# Patient Record
Sex: MALE | Race: WHITE | NOT HISPANIC OR LATINO | Employment: OTHER | URBAN - METROPOLITAN AREA
[De-identification: names, ages, dates, MRNs, and addresses within clinical notes are randomized per-mention and may not be internally consistent; named-entity substitution may affect disease eponyms.]

---

## 2021-11-16 ENCOUNTER — TELEPHONE (OUTPATIENT)
Dept: FAMILY MEDICINE | Facility: CLINIC | Age: 66
End: 2021-11-16
Payer: MEDICARE

## 2021-11-17 ENCOUNTER — TELEPHONE (OUTPATIENT)
Dept: FAMILY MEDICINE | Facility: CLINIC | Age: 66
End: 2021-11-17
Payer: MEDICARE

## 2022-01-06 ENCOUNTER — OFFICE VISIT (OUTPATIENT)
Dept: INFECTIOUS DISEASES | Facility: CLINIC | Age: 67
End: 2022-01-06
Payer: MEDICARE

## 2022-01-06 VITALS
RESPIRATION RATE: 18 BRPM | WEIGHT: 212 LBS | SYSTOLIC BLOOD PRESSURE: 120 MMHG | BODY MASS INDEX: 28.71 KG/M2 | DIASTOLIC BLOOD PRESSURE: 60 MMHG | HEIGHT: 72 IN

## 2022-01-06 DIAGNOSIS — B19.20 HEPATITIS C VIRUS INFECTION WITHOUT HEPATIC COMA, UNSPECIFIED CHRONICITY: Primary | ICD-10-CM

## 2022-01-06 DIAGNOSIS — K74.00 FIBROSIS OF LIVER: ICD-10-CM

## 2022-01-06 PROCEDURE — 99204 OFFICE O/P NEW MOD 45 MIN: CPT | Mod: S$GLB,,, | Performed by: NURSE PRACTITIONER

## 2022-01-06 PROCEDURE — 99204 PR OFFICE/OUTPT VISIT, NEW, LEVL IV, 45-59 MIN: ICD-10-PCS | Mod: S$GLB,,, | Performed by: NURSE PRACTITIONER

## 2022-01-06 RX ORDER — METFORMIN HYDROCHLORIDE 1000 MG/1
1000 TABLET ORAL 2 TIMES DAILY WITH MEALS
COMMUNITY
Start: 2021-12-20

## 2022-01-06 RX ORDER — GLIPIZIDE 10 MG/1
10 TABLET ORAL DAILY
COMMUNITY
Start: 2021-12-31

## 2022-01-06 RX ORDER — LISINOPRIL 20 MG/1
TABLET ORAL
COMMUNITY
Start: 2021-10-15

## 2022-01-06 NOTE — PATIENT INSTRUCTIONS
EDUCATION:  The natural history of Hepatitis C, including potential progression to cirrhosis was reviewed. We discussed the increased progression of liver disease secondary to alcohol use; patient was advised to avoid alcohol completely.      Transmission of Hepatitis C was reviewed, including possible sexual transmission. Sexual contacts should be screened. Patient should avoid sharing personal products such as razors, toothbrushes, etc.      Recommend avoiding raw seafood.  Limit acetaminophen to 2000mg daily.  What can you do about your liver???  1. Avoid alcohol and/or drugs  2. Avoid NSAIDs (ibuprofen, naproxen, aleve, etc)  3. Limit the use of Tylenol containing products  4. Avoid sleeping pills  5. Receive Hepatitis A & B vaccinations  6. Receive Pneumococcal vaccinations in addition to annual flu vaccines  7. Avoid sodium in your diet. Avoid canned or prepared foods   8. Avoid constipation   9. Quit smoking    Patient Education       Hepatitis C Discharge Instructions   About this topic   Hepatitis C is a disease that harms the liver. It is caused by a virus and can either cause an acute or chronic infection. The liver becomes damaged and does not work well. The virus spreads from person to person through contact with infected blood. Two ways this may happen are having unprotected sex or sharing needles. Also, children born to a mother with hepatitis C may get hepatitis C. There are many other ways you may get hepatitis C. It is the most serious kind of hepatitis. Many people have hepatitis C but they do not know it because they have no symptoms. It is important to know you cannot get hepatitis C from hugging, kissing, or sharing food.  Hepatitis C can often be cured with drugs. If not treated, some people will need a liver transplant.     What care is needed at home?   · Ask your doctor what you need to do when you go home. Make sure you ask questions if you do not understand what the doctor says. This way  you will know what you need to do.  · Take all of your drugs exactly as the doctor ordered. This is very important. You may want to use an alarm or talking pillbox to help you remember to take each dose on time and to not miss a dose.  · Never stop taking your drugs or change the dose without asking your doctor first.  · Heat may be used to help with belly pain. If your doctor tells you to use heat, put a heating pad on your belly for no more than 20 minutes at a time. Never go to sleep with a heating pad on as this can cause burns.  · Drink plenty of fluids whenever you are thirsty.  · Do not drink beer, wine, and mixed drinks (alcohol) as this can cause more liver damage.  · Avoid taking drugs and substances that can cause more harm to your liver. Ask your doctor before taking any drugs, vitamins, or supplements.  What follow-up care is needed?   Your condition needs close monitoring. Your doctor may ask you to make visits to the office to check on your progress. Be sure to keep these visits.  What drugs may be needed?   The doctor may order drugs to:  · Help with pain  · Prevent infection  · Slow or stop the virus from damaging your liver  · Protect you from other kinds of hepatitis  The drugs may cause side effects. Talk to your doctor if you are having problems taking any of your drugs.  Will physical activity be limited?   You may have to limit your activity. Talk to your doctor about the right amount of activity for you.  What changes to diet are needed?   Eating a healthy diet is important during this time. This means:  · Eat whole grain foods and foods high in fiber.  · Choose many different fruits and vegetables. Fresh or frozen is best.  · Cut back on solid fats like butter or margarine. Eat less fatty and processed foods.  · Eat more low-fat or lean meats like chicken, fish, or turkey. Eat less red meat.  · Avoid foods high in sugar.  · If you need help, ask to see a dietitian.  What problems could  happen?   · Liver damage  · Liver failure  What can be done to prevent this health problem?   · Do not share anything that might have blood on it. This includes razors, clippers, toothbrush, needles, etc.  · Wear gloves if you need to handle an infected person's blood.  · Use a condom each time you have sex. Limit your number of sexual partners.  · Make sure your  uses sterile tools.  · Do not donate blood if you have hepatitis C.  · Handle used needles and cutting tools with care.  · Place used needles in a properly labeled container.  · Cover cuts and wounds with a clean dressing.  When do I need to call the doctor?   · Signs of infection. These include a fever of 100.4°F (38°C) or higher, chills.  · Throwing up, upset stomach, or loose stools that persist  · Bloody or black stools  · Very dark yellow urine  · Yellow skin or eyes  · Swelling in your belly  · Itching  Teach Back: Helping You Understand   The Teach Back Method helps you understand the information we are giving you. After you talk with the staff, tell them in your own words what you learned. This helps to make sure the staff has described each thing clearly. It also helps to explain things that may have been confusing. Before going home, make sure you can do these:  · I can tell you about my condition.  · I can tell you what changes I need to make with my diet or drugs.  · I can tell you what I will do if I have problems with throwing up, upset stomach, or loose stools or my skin or eyes are yellow.  Where can I learn more?   Centers for Disease Control and Prevention  https://www.cdc.gov/hepatitis/hcv/cfaq.htm   National [place] of Diabetes and Digestive and Kidney Diseases  https://www.niddk.nih.gov/health-information/liver-disease/viral-hepatitis/hepatitis-c   World Health Organization  https://www.who.int/news-room/fact-sheets/detail/hepatitis-c   Last Reviewed Date   2021-03-12  Consumer Information Use and Disclaimer   This  information is not specific medical advice and does not replace information you receive from your health care provider. This is only a brief summary of general information. It does NOT include all information about conditions, illnesses, injuries, tests, procedures, treatments, therapies, discharge instructions or life-style choices that may apply to you. You must talk with your health care provider for complete information about your health and treatment options. This information should not be used to decide whether or not to accept your health care providers advice, instructions or recommendations. Only your health care provider has the knowledge and training to provide advice that is right for you.  Copyright   Copyright © 2021 [employer] and its affiliates and/or licensors. All rights reserved.

## 2022-01-06 NOTE — PROGRESS NOTES
Infectious Diseases Clinic Note    Subjective:       Patient ID: Chuy Arzate is a 66 y.o. male     Chief Complaint: Referral (+ HCV)      CHIEF COMPLAINT: Hepatitis C     HISTORY:      66-year-old male here today accompanied by his wife. Resident of [facility].  Patient referred by his primary care provider, Dr. Fuentes.  Past medical history of hypertension, hyperlipidemia, type 2 diabetes.  Recently tested positive for hepatitis C after his PCP noticed transaminitis. He is unsure how he may have contracted the illness.  He was noted to have a F4 Fibrosis score and referred here for Hep C treatment.       He denies IVDA, ETOH, Tattoos, unprotected sex, MSM, incarcerated.  No prior blood transfusions or surgical procedures.        Denies jaundice, dark urine, hematemesis, melena, slowed mentation, abdominal distention. he feels well overall. No acute issues reported.         HCV history:    Originally diagnosed: 2021  Prior icteric illnesses: None  Risks for HCV: none   - Treatment naïve  - Genotype unknown  - NS5A resistance not known    · Immunity to HAV & HBV none to date   · HIV screen neg  · Hep B Surf Ag Non-reactive  · Hep B Surf Ab non-reactive  · Hep B Core total Ab non-reactive   · Abnormal liver imaging: none ordered        Liver staging:    FibroScan = kPa ,  (CAP , S )  Fibrosure = F4   Liver function = AST/ALT elevated   U/S =  CT Abd/pevis =      FIB-4 --   APRI -   MELDS --    HPI           Past Medical History:   Diagnosis Date    Chronic hepatitis C     DM (diabetes mellitus), type 2     HTN (hypertension)     Liver fibrosis        Social History     Socioeconomic History    Marital status:    Tobacco Use    Smoking status: Current Every Day Smoker     Years: 1.00     Types: Cigarettes    Smokeless tobacco: Never Used   Substance and Sexual Activity    Alcohol use: Not Currently    Drug use: Never         Current Outpatient Medications:     glipiZIDE (GLUCOTROL) 10 MG tablet, Take 10 mg  by mouth once daily., Disp: , Rfl:     lisinopriL (PRINIVIL,ZESTRIL) 20 MG tablet, , Disp: , Rfl:     metFORMIN (GLUCOPHAGE) 1000 MG tablet, Take 1,000 mg by mouth 2 (two) times daily with meals., Disp: , Rfl:     hepatitis A and B vaccine, PF, (TWINRIX) 720 CHRIS unit- 20 mcg/mL Syrg suspension, 1 mL given IM on a 0-, 1-, and 6-month schedule for a total of 3 doses, Disp: 1 mL, Rfl: 2    Review of Systems   Constitutional: Negative for appetite change, chills, diaphoresis, fever and unexpected weight change.   Eyes: Negative for visual disturbance.   Respiratory: Negative for cough, chest tightness, shortness of breath and wheezing.    Cardiovascular: Negative for chest pain, palpitations and leg swelling.   Gastrointestinal: Negative for abdominal distention, abdominal pain, blood in stool, diarrhea, nausea and vomiting.   Endocrine: Negative for polydipsia, polyphagia and polyuria.   Genitourinary: Negative for difficulty urinating, dysuria, flank pain and hematuria.   Musculoskeletal: Negative for back pain, myalgias and neck pain.   Skin: Negative for color change and rash.   Neurological: Negative for dizziness, tremors, syncope and headaches.   Hematological: Negative for adenopathy. Does not bruise/bleed easily.   Psychiatric/Behavioral: Negative for confusion, dysphoric mood, hallucinations, sleep disturbance and suicidal ideas.           Objective:      Vitals:    01/06/22 1335   BP: 120/60   Resp: 18     Physical Exam  Vitals and nursing note reviewed.   Constitutional:       General: He is awake. He is not in acute distress.     Appearance: Normal appearance. He is well-developed, well-groomed and well-nourished. He is not ill-appearing or toxic-appearing.   Eyes:      General: No scleral icterus.     Pupils: Pupils are equal, round, and reactive to light.   Cardiovascular:      Rate and Rhythm: Normal rate.   Pulmonary:      Effort: Pulmonary effort is normal.   Chest:   Breasts:      Right: No  supraclavicular adenopathy.      Left: No supraclavicular adenopathy.       Abdominal:      General: Abdomen is protuberant. Bowel sounds are normal. There is no distension.      Palpations: Abdomen is soft. There is no fluid wave, hepatomegaly or splenomegaly.      Tenderness: There is no abdominal tenderness.   Musculoskeletal:         General: No edema. Normal range of motion.      Cervical back: Normal range of motion.      Right lower leg: No edema.      Left lower leg: No edema.   Lymphadenopathy:      Upper Body:      Right upper body: No supraclavicular adenopathy.      Left upper body: No supraclavicular adenopathy.   Skin:     General: Skin is warm and dry.      Capillary Refill: Capillary refill takes less than 2 seconds.      Coloration: Skin is not jaundiced.      Findings: No rash.   Neurological:      Mental Status: He is alert and oriented to person, place, and time. Mental status is at baseline.   Psychiatric:         Mood and Affect: Mood and affect and mood normal.         Behavior: Behavior normal. Behavior is cooperative.         Thought Content: Thought content normal.         Judgment: Judgment normal.             Assessment/Plan:       1. Hepatitis C virus infection without hepatic coma, unspecified chronicity    2. Fibrosis of liver      Problem List Items Addressed This Visit        GI    Hepatitis C virus infection without hepatic coma - Primary    Current Assessment & Plan          HCV history:    Originally diagnosed: 2021  Prior icteric illnesses: None  Risks for HCV:  none  - Treatment naïve  - Genotype   - NS5A resistance not known    · Immunity to HAV & HBV ?  · HIV screen NEG  · Hep B Surf Ag Non-reactive  · Hep B Surf Ab non-reactive  · Hep B Core total Ab NON-REACTIVE   · Abnormal liver imaging:     HCV RNA [id number]    FIBROSURE 0.79 - severe firbrosis/cirrhosis      Liver staging:    FibroScan = kPa ,(CAP , S )  Fibrosure = F4  Liver function = ALT 62, , , Tbili  1.1  U/S =  CT Abd/pevis =      FIB-4 --   APRI -   MELDS --        PLAN    1. Vaccinate Hep A/B. Given order  2. Get US to assess cirrhosis  3. Complete workup. Pt given orders. RTC in 3 weeks to discuss.  4. Repeat Fibrosure to confirm cirrhosis.   5. Discussed treatment duration 12-24 weeks which may include ribavirin.   6. Monitor for increased atorvastatin adverse effects (eg, myopathy, rhabdomyolysis) if atorvastatin and sofosbuvir are combined. May need to switch to Pravastatin at that time.  7. Drug to drug potentials reviewed.   8. See AVS for Hep C education.       The following information was discussed with the patient:     You are at higher risk for Hepatocellular Carcinoma (HCC).  Compensated   - Presence of scarring/fibrosis throughout the entire liver  - Lacking symptoms and signs of liver disease  o Jaundice (yellowing skin)  o Ascites (Swelling/fluid in the abdomen)  o Hepatic encephalopathy (Neurologic symptoms from toxin buildup)  o Esophageal varices (bleeding veins)  o Scleral icterus (yellowing of eyes)  o Easy bruising/bleeding  o Fatigue  o Black tarry stools  o Nausea/vomiting  o Abdominal pain is NOT associated   o Lesions or blisters of sun exposed skin  Your follow up will be every 6 months IF you are confirmed to have cirrhosis.  You are at higher risk for HCC and will need routine testing for life:  1. Ultrasound every 6 months   2. Checking for liver cancer (AFP tumor marker) every 6 months  3. Complete blood counts and complete metabolic panel  You will need an EGD initially and every 2-3 years thereafter.          Relevant Medications    hepatitis A and B vaccine, PF, (TWINRIX) 720 CHRIS unit- 20 mcg/mL Syrg suspension    Other Relevant Orders    Hepatitis C RNA, Quantitative, PCR    HCV Fibrosure    Hepatitis C Genotype    Hepatitis A Antibody, IgM    Treponema Pallidum (Syphillis) Antibody    Phosphatidylethanol (PETH)    Ammonia    Ceruloplasmin    Alpha-1-Antitrypsin    IgG     AFP Tumor Marker    CBC Auto Differential    Comprehensive Metabolic Panel    Iron, TIBC and Ferritin Panel    Lactate Dehydrogenase    Protime-INR    APTT    US Abdomen Complete      Other Visit Diagnoses     Fibrosis of liver        Relevant Medications    hepatitis A and B vaccine, PF, (TWINRIX) 720 CHRIS unit- 20 mcg/mL Syrg suspension    Other Relevant Orders    Hepatitis C RNA, Quantitative, PCR    HCV Fibrosure    Hepatitis C Genotype    Hepatitis A Antibody, IgM    Treponema Pallidum (Syphillis) Antibody    Phosphatidylethanol (PETH)    Ammonia    Ceruloplasmin    Alpha-1-Antitrypsin    IgG    AFP Tumor Marker    CBC Auto Differential    Comprehensive Metabolic Panel    Iron, TIBC and Ferritin Panel    Lactate Dehydrogenase    Protime-INR    APTT    US Abdomen Complete         No visits with results within 1 Month(s) from this visit.   Latest known visit with results is:   No results found for any previous visit.      No results found in the last 30 days.       All diagnostic data (labs/imaging) was reviewed with the patient and/or family member in the room.  All questions were answered to their liking. The patient and/or family member voiced understanding of all instructions provided. Expectations regarding follow up and treatment plan were voiced and confirmed prior to departure. The patient was given orders/instructions at the end of the visit for reference. They were instructed to notify my office if they have not been contacted for imaging/referrals/labs/results in 1-2 weeks. They voiced understanding of all of the above.       Duration of encounter: 45 minutes  This includes face-to-face time and non face-to-face time preparing to see the patient (eg, review of tests), obtaining and/or reviewing separately obtained history, documenting clinical information in the electronic or other health record, independently interpreting resultsand communicating results to the patient/family/caregiver, or care  coordination.    Follow up:     Patient Instructions   EDUCATION:  The natural history of Hepatitis C, including potential progression to cirrhosis was reviewed. We discussed the increased progression of liver disease secondary to alcohol use; patient was advised to avoid alcohol completely.      Transmission of Hepatitis C was reviewed, including possible sexual transmission. Sexual contacts should be screened. Patient should avoid sharing personal products such as razors, toothbrushes, etc.      Recommend avoiding raw seafood.  Limit acetaminophen to 2000mg daily.  What can you do about your liver???  4. Avoid alcohol and/or drugs  5. Avoid NSAIDs (ibuprofen, naproxen, aleve, etc)  6. Limit the use of Tylenol containing products  7. Avoid sleeping pills  8. Receive Hepatitis A & B vaccinations  9. Receive Pneumococcal vaccinations in addition to annual flu vaccines  10. Avoid sodium in your diet. Avoid canned or prepared foods   11. Avoid constipation   12. Quit smoking    Patient Education       Hepatitis C Discharge Instructions   About this topic   Hepatitis C is a disease that harms the liver. It is caused by a virus and can either cause an acute or chronic infection. The liver becomes damaged and does not work well. The virus spreads from person to person through contact with infected blood. Two ways this may happen are having unprotected sex or sharing needles. Also, children born to a mother with hepatitis C may get hepatitis C. There are many other ways you may get hepatitis C. It is the most serious kind of hepatitis. Many people have hepatitis C but they do not know it because they have no symptoms. It is important to know you cannot get hepatitis C from hugging, kissing, or sharing food.  Hepatitis C can often be cured with drugs. If not treated, some people will need a liver transplant.     What care is needed at home?   · Ask your doctor what you need to do when you go home. Make sure you ask  questions if you do not understand what the doctor says. This way you will know what you need to do.  · Take all of your drugs exactly as the doctor ordered. This is very important. You may want to use an alarm or talking pillbox to help you remember to take each dose on time and to not miss a dose.  · Never stop taking your drugs or change the dose without asking your doctor first.  · Heat may be used to help with belly pain. If your doctor tells you to use heat, put a heating pad on your belly for no more than 20 minutes at a time. Never go to sleep with a heating pad on as this can cause burns.  · Drink plenty of fluids whenever you are thirsty.  · Do not drink beer, wine, and mixed drinks (alcohol) as this can cause more liver damage.  · Avoid taking drugs and substances that can cause more harm to your liver. Ask your doctor before taking any drugs, vitamins, or supplements.  What follow-up care is needed?   Your condition needs close monitoring. Your doctor may ask you to make visits to the office to check on your progress. Be sure to keep these visits.  What drugs may be needed?   The doctor may order drugs to:  · Help with pain  · Prevent infection  · Slow or stop the virus from damaging your liver  · Protect you from other kinds of hepatitis  The drugs may cause side effects. Talk to your doctor if you are having problems taking any of your drugs.  Will physical activity be limited?   You may have to limit your activity. Talk to your doctor about the right amount of activity for you.  What changes to diet are needed?   Eating a healthy diet is important during this time. This means:  · Eat whole grain foods and foods high in fiber.  · Choose many different fruits and vegetables. Fresh or frozen is best.  · Cut back on solid fats like butter or margarine. Eat less fatty and processed foods.  · Eat more low-fat or lean meats like chicken, fish, or turkey. Eat less red meat.  · Avoid foods high in sugar.  · If  you need help, ask to see a dietitian.  What problems could happen?   · Liver damage  · Liver failure  What can be done to prevent this health problem?   · Do not share anything that might have blood on it. This includes razors, clippers, toothbrush, needles, etc.  · Wear gloves if you need to handle an infected person's blood.  · Use a condom each time you have sex. Limit your number of sexual partners.  · Make sure your  uses sterile tools.  · Do not donate blood if you have hepatitis C.  · Handle used needles and cutting tools with care.  · Place used needles in a properly labeled container.  · Cover cuts and wounds with a clean dressing.  When do I need to call the doctor?   · Signs of infection. These include a fever of 100.4°F (38°C) or higher, chills.  · Throwing up, upset stomach, or loose stools that persist  · Bloody or black stools  · Very dark yellow urine  · Yellow skin or eyes  · Swelling in your belly  · Itching  Teach Back: Helping You Understand   The Teach Back Method helps you understand the information we are giving you. After you talk with the staff, tell them in your own words what you learned. This helps to make sure the staff has described each thing clearly. It also helps to explain things that may have been confusing. Before going home, make sure you can do these:  · I can tell you about my condition.  · I can tell you what changes I need to make with my diet or drugs.  · I can tell you what I will do if I have problems with throwing up, upset stomach, or loose stools or my skin or eyes are yellow.  Where can I learn more?   Centers for Disease Control and Prevention  https://www.cdc.gov/hepatitis/hcv/cfaq.htm   National [place] of Diabetes and Digestive and Kidney Diseases  https://www.niddk.nih.gov/health-information/liver-disease/viral-hepatitis/hepatitis-c   World Health Organization  https://www.who.int/news-room/fact-sheets/detail/hepatitis-c   Last Reviewed Date    2021-03-12  Consumer Information Use and Disclaimer   This information is not specific medical advice and does not replace information you receive from your health care provider. This is only a brief summary of general information. It does NOT include all information about conditions, illnesses, injuries, tests, procedures, treatments, therapies, discharge instructions or life-style choices that may apply to you. You must talk with your health care provider for complete information about your health and treatment options. This information should not be used to decide whether or not to accept your health care providers advice, instructions or recommendations. Only your health care provider has the knowledge and training to provide advice that is right for you.  Copyright   Copyright © 2021 [employer] and its affiliates and/or licensors. All rights reserved.             No follow-ups on file.

## 2022-01-06 NOTE — ASSESSMENT & PLAN NOTE
HCV history:    Originally diagnosed: 2021  Prior icteric illnesses: None  Risks for HCV:  none  - Treatment naïve  - Genotype   - NS5A resistance not known    · Immunity to HAV & HBV ?  · HIV screen NEG  · Hep B Surf Ag Non-reactive  · Hep B Surf Ab non-reactive  · Hep B Core total Ab NON-REACTIVE   · Abnormal liver imaging:     HCV RNA [id number]    FIBROSURE 0.79 - severe firbrosis/cirrhosis      Liver staging:    FibroScan = kPa ,(CAP , S )  Fibrosure = F4  Liver function = ALT 62, , , Tbili 1.1  U/S =  CT Abd/pevis =      FIB-4 --   APRI -   MELDS --        PLAN    1. Vaccinate Hep A/B. Given order  2. Get US to assess cirrhosis  3. Complete workup. Pt given orders. RTC in 3 weeks to discuss.  4. Repeat Fibrosure to confirm cirrhosis.   5. Discussed treatment duration 12-24 weeks which may include ribavirin.   6. Monitor for increased atorvastatin adverse effects (eg, myopathy, rhabdomyolysis) if atorvastatin and sofosbuvir are combined. May need to switch to Pravastatin at that time.  7. Drug to drug potentials reviewed.   8. See AVS for Hep C education.       The following information was discussed with the patient:     You are at higher risk for Hepatocellular Carcinoma (HCC).  Compensated   - Presence of scarring/fibrosis throughout the entire liver  - Lacking symptoms and signs of liver disease  o Jaundice (yellowing skin)  o Ascites (Swelling/fluid in the abdomen)  o Hepatic encephalopathy (Neurologic symptoms from toxin buildup)  o Esophageal varices (bleeding veins)  o Scleral icterus (yellowing of eyes)  o Easy bruising/bleeding  o Fatigue  o Black tarry stools  o Nausea/vomiting  o Abdominal pain is NOT associated   o Lesions or blisters of sun exposed skin  Your follow up will be every 6 months IF you are confirmed to have cirrhosis.  You are at higher risk for HCC and will need routine testing for life:  1. Ultrasound every 6 months   2. Checking for liver cancer (AFP tumor marker)  every 6 months  3. Complete blood counts and complete metabolic panel  You will need an EGD initially and every 2-3 years thereafter.

## 2022-03-03 LAB
% SATURATION: 42 % (ref 20–50)
ABS NRBC COUNT: 0 THOU/UL (ref 0–0.01)
ABSOLUTE BASOPHIL: 0.1 10*3/UL (ref 0–0.3)
ABSOLUTE EOSINOPHIL: 0.2 10*3/UL (ref 0–0.6)
ABSOLUTE IMMATURE GRAN: 0.01 THOU/UL (ref 0–0.03)
ABSOLUTE LYMPHOCYTE: 2.2 10*3/UL (ref 1.2–4)
ABSOLUTE MONOCYTE: 0.6 10*3/UL (ref 0.1–0.8)
ALBUMIN SERPL BCP-MCNC: 3.5 G/DL (ref 3.4–5)
ALP SERPL-CCNC: 80 U/L (ref 45–117)
ALT SERPL W P-5'-P-CCNC: 84 U/L (ref 16–61)
AMMONIA BLD-SCNC: 34 UMOL/L (ref 19–54)
ANION GAP SERPL CALC-SCNC: 4 MMOL/L (ref 3–11)
APTT PPP: 36 SEC (ref 25.1–36.5)
AST SERPL-CCNC: 36 U/L (ref 15–37)
BASOPHILS NFR BLD: 0.9 % (ref 0–3)
BILIRUB SERPL-MCNC: 0.8 MG/DL (ref 0.2–1)
BUN SERPL-MCNC: 14 MG/DL (ref 7–18)
CALCIUM SERPL-MCNC: 9.1 MG/DL (ref 8.5–10.1)
CHLORIDE SERPL-SCNC: 105 MMOL/L (ref 98–107)
CO2 SERPL-SCNC: 31 MMOL/L (ref 21–32)
CREAT SERPL-MCNC: 0.87 MG/DL (ref 0.7–1.3)
EOSINOPHIL NFR BLD: 2.1 % (ref 0–6)
ERYTHROCYTE [DISTWIDTH] IN BLOOD BY AUTOMATED COUNT: 12.2 % (ref 0–15.5)
GFR ESTIMATION: > 60
GLUCOSE SERPL-MCNC: 187 MG/DL (ref 74–106)
HCT VFR BLD AUTO: 44.8 % (ref 42–52)
HGB BLD-MCNC: 15.2 G/DL (ref 14–18)
IMMATURE GRANULOCYTES: 0.1 % (ref 0–0.43)
INR PPP: 1.1 INR (ref 0.9–1.1)
IRON: 131 UG/DL (ref 65–175)
LDH SERPL L TO P-CCNC: 159 U/L (ref 87–241)
LYMPHOCYTES NFR BLD: 30.5 % (ref 20–45)
MCH RBC QN AUTO: 30.2 PG (ref 27–32)
MCHC RBC AUTO-ENTMCNC: 33.9 % (ref 32–36)
MCV RBC AUTO: 89.1 FL (ref 80–97)
MONOCYTES NFR BLD: 9.1 % (ref 2–10)
NEUTROPHILS # BLD AUTO: 4 10*3/UL (ref 1.4–7)
NEUTROPHILS NFR BLD: 57.3 % (ref 50–80)
NUCLEATED RED BLOOD CELLS: 0 % (ref 0–0.2)
PLATELETS: 135 10*3/UL (ref 130–400)
PMV BLD AUTO: 11.9 FL (ref 9.2–12.2)
POTASSIUM SERPL-SCNC: 4.6 MMOL/L (ref 3.5–5.1)
PROT SERPL-MCNC: 7.3 G/DL (ref 6.4–8.2)
PROTHROMBIN TIME: 12.4 SEC (ref 10.2–12.9)
RBC # BLD AUTO: 5.03 10*6/UL (ref 4.7–6.1)
SODIUM BLD-SCNC: 140 MMOL/L (ref 131–143)
TOTAL IRON BINDING CAPACITY: 315 UG/DL (ref 250–450)
WBC # BLD: 7.1 10*3/UL (ref 4.5–10)

## 2022-03-07 NOTE — ASSESSMENT & PLAN NOTE
HCV history:     Originally diagnosed: 2021  Prior icteric illnesses: None  Risks for HCV:  none  - Treatment naïve  - Genotype 1a or 1b   - NS5A resistance not known     · Immunity to HAV & HBV ?  · HIV screen NEG  · Hep B Surf Ag Non-reactive  · Hep B Surf Ab non-reactive  · Hep B Core total Ab NON-REACTIVE   · Abnormal liver imaging:      HCV RNA [id number]     FIBROSURE 0.79 - severe firbrosis/cirrhosis      Liver staging:     FibroScan = kPa ,(CAP , S )  Fibrosure = F4  Liver function = ALT 62, , , Tbili 1.1  U/S =     STUDY: COMPLETE ABDOMINAL ULTRASOUND    HISTORY:  Viral hepatitis C.    TECHNIQUE:  Gray scale and color Doppler imaging was utilized.    COMPARISON: No prior similar studies are available for comparison at this institution.    FINDINGS:  Homogeneous hepatic parenchymal echogenicity.  No focal hepatic mass. The portal veins are patent. No intrahepatic biliary dilatation.    The gallbladder demonstrates no evidence of intraluminal calculi or sludge.  There is no wall thickening or pericholecystic fluid.    The common bile duct measures 3 mm.    The right kidney is 13.0 x 6.1 x 5.0 cm.  The left kidney is 13.6 x 4.6 x 4.4 cm.  There is no pelvicaliectasis.  Corticomedullary differentiation is normal.  There are no contour deforming renal masses or large renal stones.  There is no cortical thinning or scarring.    The pancreatic head and neck are unremarkable. The remainder of the pancreas is obscured by bowel gas and poor acoustic window.    The spleen demonstrates normal echotexture without focal lesions.    The visualized abdominal aorta and retrohepatic inferior vena cava are patent and unremarkable.    No ascites.      IMPRESSION:  No sonographic abnormality of the abdomen.     FIB-4 --   APRI -   MELDS --        HCV RNA (IU) IU/mL 8,153,991    HCV RNA Log by NAAT log IU/mL 6.91    Comment: Hepatitis C Virus Genotype by Sequencing added.   HCV RNA Interp Not Detected  DETECTED Abnormal                 PLAN     Based on the new was set of labs, I am not sure that his previous fibrosis score is accurate. Follow up liver enzymes were not significant to establish a diagnosis of F3-4.   New Fibrosure is still pending. Plan to call him with those results. We discussed all of the tests.     1. Vaccinate Hep A/B. Too expensive for him right now.   2. US unremarkable   3. Plan to start Epclusa x 12 weeks.  Side effects of medication discussed. See AVS for education  4. Fibrosure confirmed F4 cirrhosis. Proceed w/ Epclusa x 12 weeks. Will need EGD baseline and every 2-3 yrs. Plan to refer to Hepatology for further guidance. Start treatment in the meantime.   5. Monitor for increased atorvastatin adverse effects (eg, myopathy, rhabdomyolysis). May need to switch to Pravastatin at that time.  7. Drug to drug potentials reviewed.   8. See AVS for Hep C education.         The following information was discussed with the patient:      You are at higher risk for Hepatocellular Carcinoma (HCC).  Compensated   · Presence of scarring/fibrosis throughout the entire liver  · Lacking symptoms and signs of liver disease  ? Jaundice (yellowing skin)  ? Ascites (Swelling/fluid in the abdomen)  ? Hepatic encephalopathy (Neurologic symptoms from toxin buildup)  ? Esophageal varices (bleeding veins)  ? Scleral icterus (yellowing of eyes)  ? Easy bruising/bleeding  ? Fatigue  ? Black tarry stools  ? Nausea/vomiting  ? Abdominal pain is NOT associated   ? Lesions or blisters of sun exposed skin  Your follow up will be every 6 months IF you are confirmed to have cirrhosis.  You are at higher risk for HCC and will need routine testing for life:  1. Ultrasound every 6 months   2. Checking for liver cancer (AFP tumor marker) every 6 months  3. Complete blood counts and complete metabolic panel  You will need an EGD initially and every 2-3 years thereafter.      Result Comment:  Test name                    Result  Flag  Units  RefIntvl  ------------------------------------------------------------  PEth 16:0/18:1 (POPEth)         <10       ng/mL  INTERPRETIVE INFORMATION:Phosphatidylethanol (PEth), Whole Blood  Phosphatidylethanol (PEth) homologues Result Interpretation  PEth 16:0/18:1 (POPEth)  Less than 10 ng/mL............Not detected  Less than 20 ng/mL............Abstinence or light alcohol                                consumption  20 - 200 ng/mL................Moderate alcohol consumption  Greater than 200 ng/mL........Heavy alcohol consumption or                                chronic alcohol use  PEth 16:0/18:2 (PLPEth).......Reference ranges are not well                                established.

## 2022-03-08 LAB
HCV GENTYP SERPL NAA+PROBE: ABNORMAL
HCV RNA (IU): ABNORMAL IU/ML
HCV RNA INTERPRETATION: DETECTED
HCV RNA LOG BY NAAT: 6.91 LOG IU/ML

## 2022-03-10 ENCOUNTER — OFFICE VISIT (OUTPATIENT)
Dept: INFECTIOUS DISEASES | Facility: CLINIC | Age: 67
End: 2022-03-10
Payer: MEDICARE

## 2022-03-10 VITALS
RESPIRATION RATE: 16 BRPM | SYSTOLIC BLOOD PRESSURE: 123 MMHG | OXYGEN SATURATION: 98 % | DIASTOLIC BLOOD PRESSURE: 81 MMHG | HEART RATE: 89 BPM

## 2022-03-10 DIAGNOSIS — B19.20 HEPATITIS C VIRUS INFECTION WITHOUT HEPATIC COMA, UNSPECIFIED CHRONICITY: Primary | ICD-10-CM

## 2022-03-10 DIAGNOSIS — Z71.2 ENCOUNTER TO DISCUSS TEST RESULTS: ICD-10-CM

## 2022-03-10 DIAGNOSIS — K74.69 COMPENSATED HCV CIRRHOSIS: ICD-10-CM

## 2022-03-10 DIAGNOSIS — R76.8 HIGH TOTAL IGG: ICD-10-CM

## 2022-03-10 DIAGNOSIS — B19.20 COMPENSATED HCV CIRRHOSIS: ICD-10-CM

## 2022-03-10 PROCEDURE — 99214 PR OFFICE/OUTPT VISIT, EST, LEVL IV, 30-39 MIN: ICD-10-PCS | Mod: S$GLB,,, | Performed by: NURSE PRACTITIONER

## 2022-03-10 PROCEDURE — 99214 OFFICE O/P EST MOD 30 MIN: CPT | Mod: S$GLB,,, | Performed by: NURSE PRACTITIONER

## 2022-03-10 RX ORDER — VELPATASVIR AND SOFOSBUVIR 100; 400 MG/1; MG/1
1 TABLET, FILM COATED ORAL DAILY
Qty: 82 TABLET | Refills: 0 | Status: SHIPPED | OUTPATIENT
Start: 2022-03-10 | End: 2022-10-19

## 2022-03-10 NOTE — PATIENT INSTRUCTIONS
You have been prescribed:    Epclusa -  NS5A Inhibitor  Take the medication at the same time daily. Do not miss doses. Take the medication on an empty stomach. You will be on the medication 12-24 weeks.   Avoid acid suppressing meds, specifically PPIs (i.e. omeprazole, esomeprazole, pantoprazole).  If you are on these medications, you will need to take Epclusa/Harvoni 4 hours prior to your acid suppressor.   Consult with your provider if you are on or may start Statin medications (i.e. atorvastatin, simvastatin, pravastatin, lovastatin, rosuvastatin)  Consult with your provider if you are on or may start seizure medications or amiodarone for your heart.    Consult with your provider immediately if you are on or plan to start any HIV medication.  Notify your provider if you are taking Promise's Wart, Rifampin, or Carbamazepine.   The most common adverse reactions are headache and fatigue. Upset stomach may occur as well.   If you become pregnant or plan on pregnancy, consult with your provider. When HCV infection is detected during pregnancy, treatment should be deferred until after delivery.  Hepatitis B reactivation may occur if you have been previously treated for or currently have Hepatitis B. Notify your provider immediately if this applies.          EDUCATION:  The natural history of Hepatitis C, including potential progression to cirrhosis was reviewed. We discussed the increased progression of liver disease secondary to alcohol use; patient was advised to avoid alcohol completely.      Transmission of Hepatitis C was reviewed, including possible sexual transmission. Sexual contacts should be screened. Patient should avoid sharing personal products such as razors, toothbrushes, etc.      Recommend avoiding raw seafood.  Limit acetaminophen to 2000mg daily.  What can you do about your liver???  Avoid alcohol and/or drugs  Avoid NSAIDs (ibuprofen, naproxen, aleve, etc)  Limit the use of Tylenol containing  products  Avoid sleeping pills  Receive Hepatitis A & B vaccinations  Receive Pneumococcal vaccinations in addition to annual flu vaccines  Avoid sodium in your diet. Avoid canned or prepared foods   Avoid constipation   Quit smoking       F4  You have been diagnosed with Cirrhosis.  You are at higher risk for Hepatocellular Carcinoma (HCC).  Compensated   Presence of scarring/fibrosis throughout the entire liver  Lacking symptoms and signs of liver disease  Jaundice (yellowing skin)  Ascites (Swelling/fluid in the abdomen)  Hepatic encephalopathy (Neurologic symptoms from toxin buildup)  Esophageal varices (bleeding veins)  Scleral icterus (yellowing of eyes)  Easy bruising/bleeding  Fatigue  Black tarry stools  Nausea/vomiting  Abdominal pain is NOT associated   Lesions or blisters of sun exposed skin    Your follow up will be every 6 months.  You are at higher risk for HCC and will need routine testing for life:  Ultrasound every 6 months   Checking for liver cancer (AFP tumor marker) every 6 months  Complete blood counts and complete metabolic panel  You will need an EGD initially and every 2-3 years thereafter.

## 2022-03-10 NOTE — ASSESSMENT & PLAN NOTE
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 Elevated levels of serum immunoglobulin G (IgG) are the best diagnostic marker for autoimmune hepatitis.        PLAN    1. Given copy of orders to rule out AIH

## 2022-03-10 NOTE — PROGRESS NOTES
Infectious Diseases Clinic Note    Subjective:       Patient ID: Chuy Arzate is a 66 y.o. male   Dictation #1  MRN:[id number]  CSN:[id number]    Chief Complaint: Follow-up (HCV FU) and Results        Chief Complaint: Referral (+ HCV)        CHIEF COMPLAINT: Hepatitis C     HISTORY:      66-year-old male here today to discuss his results.        Resident of [facility].  Patient referred by his primary care provider, Dr. Fuentes.  Past medical history of hypertension, hyperlipidemia, type 2 diabetes.  Recently tested positive for hepatitis C after his PCP noticed transaminitis. He is unsure how he may have contracted the illness.  He was noted to have a F4 Fibrosis score and referred here for Hep C treatment.         He denies IVDA, ETOH, Tattoos, unprotected sex, MSM, incarcerated.  No prior blood transfusions or surgical procedures.          Denies jaundice, dark urine, hematemesis, melena, slowed mentation, abdominal distention. he feels well overall. No acute issues reported.         HCV history:     Originally diagnosed: 2021  Prior icteric illnesses: None  Risks for HCV: none   - Treatment naïve  - Genotype unknown  - NS5A resistance not known     · Immunity to HAV & HBV none to date   · HIV screen neg  · Hep B Surf Ag Non-reactive  · Hep B Surf Ab non-reactive  · Hep B Core total Ab non-reactive                           Past Medical History:   Diagnosis Date    Chronic hepatitis C     DM (diabetes mellitus), type 2     HTN (hypertension)     Liver fibrosis        Social History     Socioeconomic History    Marital status:    Tobacco Use    Smoking status: Current Every Day Smoker     Years: 1.00     Types: Cigarettes    Smokeless tobacco: Never Used   Substance and Sexual Activity    Alcohol use: Not Currently    Drug use: Never         Current Outpatient Medications:     glipiZIDE (GLUCOTROL) 10 MG tablet, Take 10 mg by mouth once daily., Disp: , Rfl:     hepatitis A and B vaccine, PF, (TWINRIX) 720  CHRIS unit- 20 mcg/mL Syrg suspension, 1 mL given IM on a 0-, 1-, and 6-month schedule for a total of 3 doses, Disp: 1 mL, Rfl: 2    lisinopriL (PRINIVIL,ZESTRIL) 20 MG tablet, , Disp: , Rfl:     metFORMIN (GLUCOPHAGE) 1000 MG tablet, Take 1,000 mg by mouth 2 (two) times daily with meals., Disp: , Rfl:     sofosbuvir-velpatasvir 400-100 mg Tab, Take 1 tablet by mouth once daily., Disp: 82 tablet, Rfl: 0    Review of Systems   Constitutional: Negative for appetite change, chills, diaphoresis, fever and unexpected weight change.   Eyes: Negative for visual disturbance.   Respiratory: Negative for cough, chest tightness, shortness of breath and wheezing.    Cardiovascular: Negative for chest pain, palpitations and leg swelling.   Gastrointestinal: Negative for abdominal distention, abdominal pain, blood in stool, diarrhea, nausea and vomiting.   Endocrine: Negative for polydipsia, polyphagia and polyuria.   Genitourinary: Negative for difficulty urinating, dysuria, flank pain and hematuria.   Musculoskeletal: Negative for back pain, myalgias and neck pain.   Skin: Negative for color change and rash.   Neurological: Negative for dizziness, tremors, syncope and headaches.   Hematological: Negative for adenopathy. Does not bruise/bleed easily.   Psychiatric/Behavioral: Negative for confusion, dysphoric mood, hallucinations, sleep disturbance and suicidal ideas.           Objective:      Vitals:    03/10/22 1344   BP: 123/81   Pulse: 89   Resp: 16     Physical Exam  Vitals and nursing note reviewed.   Constitutional:       General: He is awake. He is not in acute distress.     Appearance: Normal appearance. He is well-developed and well-groomed. He is not ill-appearing or toxic-appearing.   Eyes:      General: No scleral icterus.     Pupils: Pupils are equal, round, and reactive to light.   Cardiovascular:      Rate and Rhythm: Normal rate.   Pulmonary:      Effort: Pulmonary effort is normal.   Chest:   Breasts:       Right: No supraclavicular adenopathy.      Left: No supraclavicular adenopathy.       Abdominal:      General: Abdomen is protuberant. Bowel sounds are normal. There is no distension.      Palpations: Abdomen is soft. There is no fluid wave, hepatomegaly or splenomegaly.      Tenderness: There is no abdominal tenderness.   Musculoskeletal:         General: Normal range of motion.      Cervical back: Normal range of motion.      Right lower leg: No edema.      Left lower leg: No edema.   Lymphadenopathy:      Upper Body:      Right upper body: No supraclavicular adenopathy.      Left upper body: No supraclavicular adenopathy.   Skin:     General: Skin is warm and dry.      Capillary Refill: Capillary refill takes less than 2 seconds.      Coloration: Skin is not jaundiced.      Findings: No rash.   Neurological:      Mental Status: He is alert and oriented to person, place, and time. Mental status is at baseline.   Psychiatric:         Mood and Affect: Mood normal.         Behavior: Behavior normal. Behavior is cooperative.         Thought Content: Thought content normal.         Judgment: Judgment normal.             Assessment/Plan:       1. Hepatitis C virus infection without hepatic coma, unspecified chronicity    2. High total IgG    3. Encounter to discuss test results    4. Compensated HCV cirrhosis      Problem List Items Addressed This Visit        GI    Hepatitis C virus infection without hepatic coma - Primary    Current Assessment & Plan              HCV history:     Originally diagnosed: 2021  Prior icteric illnesses: None  Risks for HCV:  none  - Treatment naïve  - Genotype 1a or 1b   - NS5A resistance not known     · Immunity to HAV & HBV ?  · HIV screen NEG  · Hep B Surf Ag Non-reactive  · Hep B Surf Ab non-reactive  · Hep B Core total Ab NON-REACTIVE   · Abnormal liver imaging:      HCV RNA [id number]     FIBROSURE 0.79 - severe firbrosis/cirrhosis      Liver staging:     FibroScan = kPa ,(CAP , S  )  Fibrosure = F4  Liver function = ALT 62, , , Tbili 1.1  U/S =     STUDY: COMPLETE ABDOMINAL ULTRASOUND    HISTORY:  Viral hepatitis C.    TECHNIQUE:  Gray scale and color Doppler imaging was utilized.    COMPARISON: No prior similar studies are available for comparison at this institution.    FINDINGS:  Homogeneous hepatic parenchymal echogenicity.  No focal hepatic mass. The portal veins are patent. No intrahepatic biliary dilatation.    The gallbladder demonstrates no evidence of intraluminal calculi or sludge.  There is no wall thickening or pericholecystic fluid.    The common bile duct measures 3 mm.    The right kidney is 13.0 x 6.1 x 5.0 cm.  The left kidney is 13.6 x 4.6 x 4.4 cm.  There is no pelvicaliectasis.  Corticomedullary differentiation is normal.  There are no contour deforming renal masses or large renal stones.  There is no cortical thinning or scarring.    The pancreatic head and neck are unremarkable. The remainder of the pancreas is obscured by bowel gas and poor acoustic window.    The spleen demonstrates normal echotexture without focal lesions.    The visualized abdominal aorta and retrohepatic inferior vena cava are patent and unremarkable.    No ascites.      IMPRESSION:  No sonographic abnormality of the abdomen.     FIB-4 --   APRI -   MELDS --        HCV RNA (IU) IU/mL 8,153,991    HCV RNA Log by NAAT log IU/mL 6.91    Comment: Hepatitis C Virus Genotype by Sequencing added.   HCV RNA Interp Not Detected DETECTED Abnormal                 PLAN     Based on the new was set of labs, I am not sure that his previous fibrosis score is accurate. Follow up liver enzymes were not significant to establish a diagnosis of F3-4.   New Fibrosure is still pending. Plan to call him with those results. We discussed all of the tests.     1. Vaccinate Hep A/B. Too expensive for him right now.   2. US unremarkable   3. Plan to start Epclusa x 12 weeks.  Side effects of medication  discussed. See AVS for education  4. Fibrosure confirmed F4 cirrhosis. Proceed w/ Epclusa x 12 weeks. Will need EGD baseline and every 2-3 yrs. Plan to refer to Hepatology for further guidance. Start treatment in the meantime.   5. Monitor for increased atorvastatin adverse effects (eg, myopathy, rhabdomyolysis). May need to switch to Pravastatin at that time.  7. Drug to drug potentials reviewed.   8. See AVS for Hep C education.         The following information was discussed with the patient:      You are at higher risk for Hepatocellular Carcinoma (HCC).  Compensated   · Presence of scarring/fibrosis throughout the entire liver  · Lacking symptoms and signs of liver disease  ? Jaundice (yellowing skin)  ? Ascites (Swelling/fluid in the abdomen)  ? Hepatic encephalopathy (Neurologic symptoms from toxin buildup)  ? Esophageal varices (bleeding veins)  ? Scleral icterus (yellowing of eyes)  ? Easy bruising/bleeding  ? Fatigue  ? Black tarry stools  ? Nausea/vomiting  ? Abdominal pain is NOT associated   ? Lesions or blisters of sun exposed skin  Your follow up will be every 6 months IF you are confirmed to have cirrhosis.  You are at higher risk for HCC and will need routine testing for life:  1. Ultrasound every 6 months   2. Checking for liver cancer (AFP tumor marker) every 6 months  3. Complete blood counts and complete metabolic panel  You will need an EGD initially and every 2-3 years thereafter.      Result Comment:  Test name                    Result Flag  Units  RefIntvl  ------------------------------------------------------------  PEth 16:0/18:1 (POPEth)         <10       ng/mL  INTERPRETIVE INFORMATION:Phosphatidylethanol (PEth), Whole Blood  Phosphatidylethanol (PEth) homologues Result Interpretation  PEth 16:0/18:1 (POPEth)  Less than 10 ng/mL............Not detected  Less than 20 ng/mL............Abstinence or light alcohol                                consumption  20 - 200  ng/mL................Moderate alcohol consumption  Greater than 200 ng/mL........Heavy alcohol consumption or                                chronic alcohol use  PEth 16:0/18:2 (PLPEth).......Reference ranges are not well                                established.           Relevant Medications    sofosbuvir-velpatasvir 400-100 mg Tab    Other Relevant Orders    Anti-Smooth Muscle Antibody    Anti-Liver, Kidney, Microsome Ab    Antimitochondrial Antibody    Anti-Neutrophilic Cytoplasmic Antibody    Immunoglobulins (IgG, IgA, IgM) Quantitative    Protein Electrophoresis, Serum    Immunofixation Electrophoresis    SURJIT w/Rflx to Pattern/Titer & Profile    Ambulatory referral/consult to Hepatology    Compensated HCV cirrhosis    Current Assessment & Plan     REPEAT FIBROSURE SHOWED F4 CIRRHOSIS.       Refer to Hepatology for continued follow up. He will need EGD and AFP tumor q 6 mts interval.      Epclusa x 12 weeks as planned.            Relevant Orders    Ambulatory referral/consult to Hepatology       Other    High total IgG    Current Assessment & Plan       Hypergammaglobulinemia is frequently observed in patients with chronic liver disease. Elevated levels of serum immunoglobulin G (IgG) are the best diagnostic marker for autoimmune hepatitis.        PLAN    1. Given copy of orders to rule out AIH                Relevant Orders    Anti-Smooth Muscle Antibody    Anti-Liver, Kidney, Microsome Ab    Antimitochondrial Antibody    Anti-Neutrophilic Cytoplasmic Antibody    Immunoglobulins (IgG, IgA, IgM) Quantitative    Protein Electrophoresis, Serum    Immunofixation Electrophoresis    SURJIT w/Rflx to Pattern/Titer & Profile    Ambulatory referral/consult to Hepatology      Other Visit Diagnoses     Encounter to discuss test results             Orders Only on 03/03/2022   Component Date Value Ref Range Status    Alpha 2-Macroglobulins, Qn 03/03/2022 376 (A) 131 - 293 mg/dL Final    ALT 03/03/2022 70 (A) 5 - 50 U/L  Final    AST 03/03/2022 41  9 - 50 U/L Final    GGT 03/03/2022 88 (A) 7 - 51 U/L Final    BUN 03/03/2022 14  7 - 20 mg/dL Final    Platelets 03/03/2022 135  k/uL Final    PT Index 03/03/2022 86 (A) 90 - 120 % Final    Fibrosis Score 03/03/2022 0.92   Final    CIRRHOMETER PATIENT SCORE 03/03/2022 0.54   Final    FIBROSIS METAVIR CLASSIFY 03/03/2022 F4[F3-F4]   Final    Comment: INTERPRETIVE INFORMATION: Fibrosis Metavir ClassificationFibroMeter (fibrosis score) commentsF0/F1      Equal probability between F0 and F1F1[F1-F2]  Predominance of F1, but F2 is possibleF2[F1-F2]  Predominance of F2, but F1 is possibleF2[F1-F3]    Predominance of F2, but F1 and F3 are possibleF2/F3      Equal probability between F2 and F3F3[F2-F4]  Predominance of F3, but F2 and F4 are possibleF3[F3-F4]  Predominance of F3, but F4 is possibleF4[F3-F4]  Predominance of F4, but F3 is possible      INFLAMETER PATIENT SCORE 03/03/2022 0.74   Final    INFLAMETER METAVIR CLASSIFY 03/03/2022 A2/A3   Final    INTERPRETIVE INFORMATION: InflaMeter Metavir ClassificationInflaMeter (activity score) commentsA0/A1     Equal probability between A0 and A1A1/A2     Equal probability between A1 and A2A2/A3     Equal probability between A2 and A3    EER FIBROMETER REPORT 03/03/2022 SEE NOTE   Final    Access [facility] Enhanced Report using the link below:-Direct access: [email or web address]    FibroMeter Interpretation 03/03/2022 SEE REPORT   Final    Comment: [17][13]INTERPRETIVE INFORMATION: Fibrometer InterpretationCalculations for the final report are based on accurate data forage, gender, and platelet count. If any of this informationneeds to be corrected, please contact [facility] Client Services torequest a   recalculation. Client Services may be contacted [phone].The Echosens FibroMeter profile serves as a surrogate marker ofliver fibrosis, cirrhosis, and necro-inflammatory activity. Aproprietary algorithm  "calculates and compares results from 7blood   markers along with age and gender to provide a patientscore (from 0 to 1) and a correlated fibrosis stage (MetavirF0-F4) and activity grade (Metavir A0-A3). Thefibrosis/cirrhosis score is further evaluated by a rules-basedsystem to detect anomalous   profile results which may modify thefibrosis/cirrhosis score as needed.Results should be interpreted in conjunction with the patient'sclinical history; particularly when the rules-based system hasmodified the scores.Legend:-Metavir is a                            histological   scoring system for determining theextent of liver fibrosis and inflammation.STAGE OF FIBROSIS (F scale)F0 = no fibrosisF1 = portal fibrosis without septaF2 = portal fibrosis with few septaF3 = numerous septa without cirrhosisF4 = cirrhosisGRADE OF   NECRO-INFLAMMATORY ACTIVITY (A scale)A0 = no activityA1 = mild activityA2 = moderate activityA3 = severe activity-Platelet count result provided by client.-The Prothrombin Index test expresses the Prothrombin Time (PT)as a percentage of normal, and is   used to standardize PT resultsacross different instrument/reagent combinations.This test was developed and its performance characteristicsdetermined by [facility]. It has not been cleared orapproved by the [place] Food and Drug Administration. This   test wasperformed in a CLIA certified laboratory and is intended forclinical purposes.Performed By: [facility] [place] [address] [id number] Director: Maeve Waters MD     Orders Only on 03/03/2022   Component Date Value Ref Range Status    HCV RNA (IU) 03/03/2022 8,153,991  IU/mL Final    HCV RNA Log by NAAT 03/03/2022 6.91  log IU/mL Final    Hepatitis C Virus Genotype by Sequencing added.    HCV RNA Interp 03/03/2022 DETECTED (A) Not Detected Final    Comment: INTERPRETIVE INFORMATION: HCV by Quantitative NAATNormal range for this assay is "Not Detected".The quantitative " "range of this assay is 10 - 100,000,000 IU/mL(1.0 - 8.0 log IU/mL).Lower limit of quantitation (LLoQ):10 IU/mL (1.0 log IU/mL)LLoQ values do   not apply to diluted specimens.A result of "Not Detected" does not rule out the presence ofinhibitors in the patient specimen or hepatitis C virus RNAconcentrations below the level of detection of the test. Careshould be taken when interpreting any   single viral loaddetermination.This test should not be used for blood donor screening,associated re-entry protocols, or for screening Human Cell,Tissues and Cellular Tissue-Based Products (HCT/P).Performed By: [facility] [place] [address] [id number] Director: Maeve Waters MD, MS      HCV Genotype 03/03/2022 1A OR 1B   Final    Comment: Cannot be further subtyped into Type 1a or Type 1b due tohigh conservation of the 5' untranslated region of the HCVgenome. In addition, Type 6 virus may be misclassified asType 1 in some cases. The Hepatitis C Virus High-ResolutionGenotype by Sequencing   test ([employer] test code 4537059)provides a higher level of subtype resolution.INTERPRETIVE INFORMATION:  Hepatitis C GenotypingHepatitis C Viral RNA is tested using reverse transcriptionpolymerase chain reaction (RT-PCR) to amplify a specific portionof the   5' untranslated region (5' UTR) of the viral genome. Theamplified nucleic acid is sequenced bi-directionally usingdye-terminator chemistry ([employer]). Sequencing data is compared to adatabase of characterized sequences.Isolates of hepatitis C virus are   grouped into six majorgenotypes (1-6). These genotypes are subtyped according tosequence characteristics. Due to high conservation of the 5'un-translated region of the HCV genome, this test haslimitations in differentiating subtype 1a fro                           m 1b.   Therefore,these subtypes will be reported as 1a or 1b. In rare instances,Type 6 virus may be misclassified as Type 1.This test was developed and its " performance characteristicsdetermined by [facility]. It has not been cleared orapproved by the [place]   Food and Drug Administration. This test wasperformed in a CLIA certified laboratory and is intended forclinical purposes.Performed By: [facility] [place] [address] [id number] Director: Maeve Waters MD     Orders Only on 03/03/2022   Component Date Value Ref Range Status    Sodium 03/03/2022 140  131 - 143 mmol/L Final    Potassium 03/03/2022 4.6  3.5 - 5.1 mmol/L Final    Chloride 03/03/2022 105  98 - 107 mmol/L Final    CO2 03/03/2022 31  21 - 32 mmol/L Final    Anion Gap 03/03/2022 4.0  3.0 - 11.0 mmol/L Final    BUN 03/03/2022 14.0  7.0 - 18.0 mg/dL Final    Creatinine 03/03/2022 0.866  0.700 - 1.30 mg/dL Final    GFR ESTIMATION 03/03/2022 > 60  >60 Final    Glucose 03/03/2022 187 (A) 74 - 106 mg/dL Final    Calcium 03/03/2022 9.1  8.5 - 10.1 mg/dL Final    Total Bilirubin 03/03/2022 0.8  0.2 - 1.0 mg/dL Final    AST 03/03/2022 36  15 - 37 U/L Final    ALT 03/03/2022 84 (A) 16 - 61 U/L Final    Total Protein 03/03/2022 7.3  6.4 - 8.2 g/dL Final    Albumin 03/03/2022 3.5  3.4 - 5.0 g/dL Final    Alkaline Phosphatase 03/03/2022 80  45 - 117 U/L Final    LD 03/03/2022 159  87 - 241 U/L Final    Iron 03/03/2022 131  65 - 175 ug/dL Final    TIBC 03/03/2022 315  250 - 450 ug/dL Final    % Saturation 03/03/2022 42  20 - 50 % Final   Orders Only on 03/03/2022   Component Date Value Ref Range Status    Ammonia 03/03/2022 34  19 - 54 umol/L Final   Orders Only on 03/03/2022   Component Date Value Ref Range Status    Prothrombin Time 03/03/2022 12.4  10.2 - 12.9 sec Final    INR 03/03/2022 1.1  0.9 - 1.1 INR Final    aPTT 03/03/2022 36.0  25.1 - 36.5 sec Final    PTT Critical Values:non-therapeutic: +/> 90 secondstherapeutic:     +/> 180 secondsCONTACT CLINICAL PHARMACIST FOR ASSISTANCE WITH HEPARINPROTOCOLS.   Orders Only on 03/03/2022   Component Date Value Ref  Range Status    WBC 2022 7.1  4.5 - 10.0 10*3/uL Final    RBC 2022 5.03  4.70 - 6.10 10*6/uL Final    Hemoglobin 2022 15.2  14.0 - 18.0 g/dL Final    Hematocrit 2022 44.8  42.0 - 52.0 % Final    MCV 2022 89.1  80.0 - 97.0 fL Final    MCH 2022 30.2  27.0 - 32.0 pg Final    MCHC 2022 33.9  32.0 - 36.0 % Final    RDW RBC Auto-Rto 2022 12.2  0.0 - 15.5 % Final    Platelets 2022 135  130 - 400 10*3/uL Final    MPV 2022 11.9  9.2 - 12.2 fL Final    Neutrophils 2022 57.3  50 - 80 % Final    Immature Granulocytes 2022 0.10  0.0 - 0.43 % Final    Lymphocytes 2022 30.5  20.0 - 45.0 % Final    Monocytes 2022 9.1  2 - 10 % Final    Eosinophils 2022 2.1  0 - 6 % Final    Basophils 2022 0.9  0 - 3 % Final    nRBC# 2022 0.0  0 - 0.2 % Final    Neutrophils Absolute 2022 4.0  1.4 - 7.0 10*3/uL Final    Immature Granulocytes Absolute 2022 0.0100  0.0 - 0.0310 thou/uL Final    Lymphocytes Absolute 2022 2.2  1.2 - 4.0 10*3/uL Final    Monocytes Absolute 2022 0.6  0.1 - 0.8 10*3/uL Final    Eosinophils Absolute 2022 0.2  0.0 - 0.6 10*3/uL Final    Basophils Absolute 2022 0.1  0.0 - 0.3 10*3/uL Final    nRBC Count Absolute 2022 0.000  0 - 0.012 thou/uL Final      US Abdomen Complete    Result Date: 3/3/2022                                RADIOLOGY REPORT        PT NAME: CHELI RAHMAN [facility] [place], [place]     : 1955 M 66             [address] [facility]    ACCT: [id number]                                              [facility] Rec #: [id number]                                        [id number]    Patient Location: AL.RADUS/             Procedure: ABDOMEN COMPLETE    REQUISITION #: [id number]      REPORT #: [id number]           DATE OF EXAM: 22    TIME OF EXAM: 0800       STUDY: COMPLETE ABDOMINAL ULTRASOUND        HISTORY:  Viral  hepatitis C.        TECHNIQUE:  Gray scale and color Doppler imaging was utilized.        COMPARISON: No prior similar studies are available for comparison at this   institution.        FINDINGS:    Homogeneous hepatic parenchymal echogenicity.  No focal hepatic mass. The   portal veins are patent. No intrahepatic biliary dilatation.        The gallbladder demonstrates no evidence of intraluminal calculi or sludge.    There is no wall thickening or pericholecystic fluid.    The common bile   duct measures 3 mm.        The right kidney is 13.0 x 6.1 x 5.0 cm.    The left kidney is 13.6 x 4.6 x 4.4 cm.    There is no pelvicaliectasis.  Corticomedullary differentiation is normal.     There are no contour deforming renal masses or large renal stones.  There is   no cortical thinning or scarring.        The pancreatic head and neck are unremarkable. The remainder of the pancreas   is obscured by bowel gas and poor acoustic window.        The spleen demonstrates normal echotexture without focal lesions.        The visualized abdominal aorta and retrohepatic inferior vena cava are   patent and unremarkable.        No ascites.            IMPRESSION:    No sonographic abnormality of the abdomen.        DICTATING PHYSICIAN:  FERDINAND ARGUETA MD                   Date Dictated: 03/03/22 1008        Signed By:  FERDINAND ARGUETA MD <Electronically signed by FERDINAND ARGUETA MD in    OV>    Date Signed:  03/03/22 1009     CC: PRIYANKA ROSS ; PRIYANKA ROSS      ADMITTING PHYSICIAN:                                                                                                    ORDERING PHY: PRIYANKA ROSS                                                                                                                                                      ATTENDING PHY: PRIYANKA ROSS    Patient Status:  REG CLI    Admit Service Date: 03/03/22               Duration of encounter: 30 minutes  This includes face-to-face  time and non face-to-face time preparing to see the patient (eg, review of tests), obtaining and/or reviewing separately obtained history, documenting clinical information in the electronic or other health record, independently interpreting resultsand communicating results to the patient/family/caregiver, or care coordination.      All diagnostic data (labs/imaging) was reviewed with the patient and/or family member in the room.  All questions were answered to their liking. The patient and/or family member voiced understanding of all instructions provided. Expectations regarding follow up and treatment plan were voiced and confirmed prior to departure. The patient was given orders/instructions at the end of the visit for reference. They were instructed to notify my office if they have not been contacted for imaging/referrals/labs/results in 1-2 weeks. They voiced understanding of all of the above.     Follow up:     Patient Instructions   You have been prescribed:    Epclusa -  NS5A Inhibitor  1. Take the medication at the same time daily. Do not miss doses. Take the medication on an empty stomach. You will be on the medication 12-24 weeks.   2. Avoid acid suppressing meds, specifically PPIs (i.e. omeprazole, esomeprazole, pantoprazole).  If you are on these medications, you will need to take Epclusa/Harvoni 4 hours prior to your acid suppressor.   3. Consult with your provider if you are on or may start Statin medications (i.e. atorvastatin, simvastatin, pravastatin, lovastatin, rosuvastatin)  4. Consult with your provider if you are on or may start seizure medications or amiodarone for your heart.    5. Consult with your provider immediately if you are on or plan to start any HIV medication.  6. Notify your provider if you are taking Promise's Wart, Rifampin, or Carbamazepine.   7. The most common adverse reactions are headache and fatigue. Upset stomach may occur as well.   8. If you become pregnant or plan on  pregnancy, consult with your provider. When HCV infection is detected during pregnancy, treatment should be deferred until after delivery.  9. Hepatitis B reactivation may occur if you have been previously treated for or currently have Hepatitis B. Notify your provider immediately if this applies.          EDUCATION:  The natural history of Hepatitis C, including potential progression to cirrhosis was reviewed. We discussed the increased progression of liver disease secondary to alcohol use; patient was advised to avoid alcohol completely.      Transmission of Hepatitis C was reviewed, including possible sexual transmission. Sexual contacts should be screened. Patient should avoid sharing personal products such as razors, toothbrushes, etc.      Recommend avoiding raw seafood.  Limit acetaminophen to 2000mg daily.  What can you do about your liver???  1. Avoid alcohol and/or drugs  2. Avoid NSAIDs (ibuprofen, naproxen, aleve, etc)  3. Limit the use of Tylenol containing products  4. Avoid sleeping pills  5. Receive Hepatitis A & B vaccinations  6. Receive Pneumococcal vaccinations in addition to annual flu vaccines  7. Avoid sodium in your diet. Avoid canned or prepared foods   8. Avoid constipation   9. Quit smoking       F4  You have been diagnosed with Cirrhosis.  You are at higher risk for Hepatocellular Carcinoma (HCC).  Compensated   - Presence of scarring/fibrosis throughout the entire liver  - Lacking symptoms and signs of liver disease  o Jaundice (yellowing skin)  o Ascites (Swelling/fluid in the abdomen)  o Hepatic encephalopathy (Neurologic symptoms from toxin buildup)  o Esophageal varices (bleeding veins)  o Scleral icterus (yellowing of eyes)  o Easy bruising/bleeding  o Fatigue  o Black tarry stools  o Nausea/vomiting  o Abdominal pain is NOT associated   o Lesions or blisters of sun exposed skin    Your follow up will be every 6 months.  You are at higher risk for HCC and will need routine testing  for life:  1. Ultrasound every 6 months   2. Checking for liver cancer (AFP tumor marker) every 6 months  3. Complete blood counts and complete metabolic panel  You will need an EGD initially and every 2-3 years thereafter.        Follow up in about 8 weeks (around 5/5/2022), or if symptoms worsen or fail to improve.

## 2022-03-14 PROBLEM — B19.20 COMPENSATED HCV CIRRHOSIS: Status: ACTIVE | Noted: 2022-03-14

## 2022-03-14 PROBLEM — K74.69 COMPENSATED HCV CIRRHOSIS: Status: ACTIVE | Noted: 2022-03-14

## 2022-03-14 LAB
ALPHA 2-MACROGLOBULINS, QN: 376 MG/DL (ref 131–293)
ALT SERPL W P-5'-P-CCNC: 70 U/L (ref 5–50)
AST SERPL-CCNC: 41 U/L (ref 9–50)
BUN SERPL-MCNC: 14 MG/DL (ref 7–20)
CIRRHOMETER PATIENT SCORE: 0.54
EER FIBROMETER REPORT: ABNORMAL
FIBROMETER INTERPRETATION: ABNORMAL
FIBROSIS SCORE: 0.92
GGT: 88 U/L (ref 7–51)
INFLAMETER PATIENT SCORE: 0.74
Lab: 86 % (ref 90–120)
Lab: ABNORMAL
Lab: ABNORMAL
PLATELET # BLD AUTO: 135 K/UL

## 2022-03-14 NOTE — ASSESSMENT & PLAN NOTE
REPEAT FIBROSURE SHOWED F4 CIRRHOSIS.       Refer to Hepatology for continued follow up. He will need EGD and AFP tumor q 6 mts interval.      Epclusa x 12 weeks as planned.

## 2022-03-15 ENCOUNTER — TELEPHONE (OUTPATIENT)
Dept: FAMILY MEDICINE | Facility: CLINIC | Age: 67
End: 2022-03-15
Payer: MEDICARE

## 2022-03-15 ENCOUNTER — PATIENT MESSAGE (OUTPATIENT)
Dept: INFECTIOUS DISEASES | Facility: CLINIC | Age: 67
End: 2022-03-15
Payer: MEDICARE

## 2022-03-15 NOTE — TELEPHONE ENCOUNTER
----- Message from Allegra Bautista sent at 3/15/2022 10:57 AM CDT -----  Contact: self  Patient ask for a call back regarding a medication he been waiting on for 2 weeks now. Patient can be reached at [phone]

## 2022-03-17 LAB
AFP-TUMOR MARKER: 8 NG/ML (ref 0–9)
ALPHA-1 ANTI-TRYPSIN: 158 MG/DL (ref 90–200)
CERULOPLASMIN SERPL-MCNC: 22 MG/DL (ref 15–30)
HCV AB INDEX: >11 IV
HCV IGG SERPL QL IA: ABNORMAL
HCV RNA QUANT BY NAAT: ABNORMAL
HCV RNA QUANT PCR LOG: 6.91
HCV RNA QUANT PCR: ABNORMAL
IGG SERPL-MCNC: 1725 MG/DL (ref 768–1632)
T PALLIDUM ANTIBODIES: 0.1 IV

## 2022-03-23 PROBLEM — D89.0 POLYCLONAL GAMMOPATHY: Status: ACTIVE | Noted: 2022-03-23

## 2022-03-23 LAB
ACTIN (SMOOTH MUSCLE) ANTIBODY (IGG): 8 UNITS (ref 0–19)
ALBUMIN, ELECTROPHORESIS: 4.05 G/DL (ref 3.75–5.01)
ALPHA1 GLOB FLD ELPH-MCNC: 0.31 G/DL (ref 0.19–0.46)
ALPHA2 GLOB FLD ELPH-MCNC: 0.91 G/DL (ref 0.48–1.05)
ANA SER-ACNC: NORMAL
B-GLOBULIN FLD ELPH-MCNC: 0.8 G/DL (ref 0.48–1.1)
DEPRECATED MITOCHONDRIA M2 IGG SER-ACNC: 17.4 UNITS (ref 0–24.9)
EER PROTEIN ELECTROPHORESIS, SERUM: ABNORMAL
GAMMA GLOB FLD ELPH-MCNC: 1.72 G/DL (ref 0.62–1.51)
IGA SERPL-MCNC: 218 MG/DL (ref 68–408)
IGG SERPL-MCNC: 1806 MG/DL (ref 768–1632)
IGM: 115 MG/DL (ref 35–263)
IMMUNOFIXATION INTERPRETATION: ABNORMAL
INTERPRETATION: NORMAL
REPORT: NORMAL
TOTAL PROTEIN, ELECTROPHORESIS: 7.8 G/DL (ref 6.3–8.2)

## 2022-03-31 ENCOUNTER — OFFICE VISIT (OUTPATIENT)
Dept: FAMILY MEDICINE | Facility: CLINIC | Age: 67
End: 2022-03-31
Payer: MEDICARE

## 2022-03-31 ENCOUNTER — TELEPHONE (OUTPATIENT)
Dept: HEPATOLOGY | Facility: CLINIC | Age: 67
End: 2022-03-31
Payer: MEDICARE

## 2022-03-31 DIAGNOSIS — E78.5 HYPERLIPIDEMIA, UNSPECIFIED HYPERLIPIDEMIA TYPE: ICD-10-CM

## 2022-03-31 DIAGNOSIS — R76.8 HIGH TOTAL IGG: ICD-10-CM

## 2022-03-31 DIAGNOSIS — D89.0 POLYCLONAL GAMMOPATHY: ICD-10-CM

## 2022-03-31 DIAGNOSIS — K74.69 COMPENSATED HCV CIRRHOSIS: Primary | ICD-10-CM

## 2022-03-31 DIAGNOSIS — B19.20 COMPENSATED HCV CIRRHOSIS: Primary | ICD-10-CM

## 2022-03-31 PROCEDURE — 99214 OFFICE O/P EST MOD 30 MIN: CPT | Mod: S$GLB,,, | Performed by: NURSE PRACTITIONER

## 2022-03-31 PROCEDURE — 99214 PR OFFICE/OUTPT VISIT, EST, LEVL IV, 30-39 MIN: ICD-10-PCS | Mod: S$GLB,,, | Performed by: NURSE PRACTITIONER

## 2022-03-31 RX ORDER — ATORVASTATIN CALCIUM 40 MG/1
40 TABLET, FILM COATED ORAL NIGHTLY
COMMUNITY
Start: 2022-01-18

## 2022-03-31 NOTE — ASSESSMENT & PLAN NOTE
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 Elevated levels of serum immunoglobulin G (IgG) are the best diagnostic marker for autoimmune hepatitis.           PLAN    1. Recommend he follow up with hepatology in [place]. \  2. Recommend PCP order RF and Anti-ccp to evaluate rheumatoid.

## 2022-03-31 NOTE — TELEPHONE ENCOUNTER
----- Message from Sylvia Cui MA sent at 3/31/2022 11:49 AM CDT -----    ----- Message -----  From: Katie Potter MA  Sent: 3/31/2022  11:39 AM CDT  To: Chad Morales

## 2022-03-31 NOTE — PROGRESS NOTES
Infectious Diseases Clinic Note    Subjective:       Patient ID: Chuy Arzate is a 66 y.o. male     Chief Complaint: Results        Chief Complaint: Referral (+ HCV)        CHIEF COMPLAINT: Hepatitis C     HISTORY:      66-year-old male here today to discuss his results.        Resident of [facility].  Patient referred by his primary care provider, Dr. Fuentes.  Past medical history of hypertension, hyperlipidemia, type 2 diabetes.  Recently tested positive for hepatitis C after his PCP noticed transaminitis. He is unsure how he may have contracted the illness.  He was noted to have a F4 Fibrosis score and referred here for Hep C treatment.  Has yet to start Epclusa and has not been contacted by Hepatology in [place].         He denies IVDA, ETOH, Tattoos, unprotected sex, MSM, incarcerated.  No prior blood transfusions or surgical procedures.          Denies jaundice, dark urine, hematemesis, melena, slowed mentation, abdominal distention. he feels well overall. No acute issues reported.         HCV history:     Originally diagnosed: 2021  Prior icteric illnesses: None  Risks for HCV: none   - Treatment naïve  - Genotype unknown  - NS5A resistance not known     · Immunity to HAV & HBV none to date   · HIV screen neg  · Hep B Surf Ag Non-reactive  · Hep B Surf Ab non-reactive  · Hep B Core total Ab non-reactive                           Past Medical History:   Diagnosis Date    Chronic hepatitis C     DM (diabetes mellitus), type 2     HTN (hypertension)     Liver fibrosis        Social History     Socioeconomic History    Marital status:    Tobacco Use    Smoking status: Current Every Day Smoker     Years: 1.00     Types: Cigarettes    Smokeless tobacco: Never Used   Substance and Sexual Activity    Alcohol use: Not Currently    Drug use: Never         Current Outpatient Medications:     atorvastatin (LIPITOR) 40 MG tablet, Take 40 mg by mouth every evening., Disp: , Rfl:     glipiZIDE (GLUCOTROL)  10 MG tablet, Take 10 mg by mouth once daily., Disp: , Rfl:     hepatitis A and B vaccine, PF, (TWINRIX) 720 CHRIS unit- 20 mcg/mL Syrg suspension, 1 mL given IM on a 0-, 1-, and 6-month schedule for a total of 3 doses, Disp: 1 mL, Rfl: 2    lisinopriL (PRINIVIL,ZESTRIL) 20 MG tablet, , Disp: , Rfl:     metFORMIN (GLUCOPHAGE) 1000 MG tablet, Take 1,000 mg by mouth 2 (two) times daily with meals., Disp: , Rfl:     sofosbuvir-velpatasvir 400-100 mg Tab, Take 1 tablet by mouth once daily., Disp: 82 tablet, Rfl: 0    Review of Systems   Constitutional: Negative for appetite change, chills, diaphoresis, fever and unexpected weight change.   Eyes: Negative for visual disturbance.   Respiratory: Negative for cough, chest tightness, shortness of breath and wheezing.    Cardiovascular: Negative for chest pain, palpitations and leg swelling.   Gastrointestinal: Negative for abdominal distention, abdominal pain, blood in stool, diarrhea, nausea and vomiting.   Endocrine: Negative for polydipsia, polyphagia and polyuria.   Genitourinary: Negative for difficulty urinating, dysuria, flank pain and hematuria.   Musculoskeletal: Negative for back pain, myalgias and neck pain.   Skin: Negative for color change and rash.   Neurological: Negative for dizziness, tremors, syncope and headaches.   Hematological: Negative for adenopathy. Does not bruise/bleed easily.   Psychiatric/Behavioral: Negative for confusion, dysphoric mood, hallucinations, sleep disturbance and suicidal ideas.           Objective:      There were no vitals filed for this visit.  Physical Exam  Vitals and nursing note reviewed.   Constitutional:       General: He is awake. He is not in acute distress.     Appearance: Normal appearance. He is well-developed and well-groomed. He is not ill-appearing or toxic-appearing.   Eyes:      General: No scleral icterus.     Pupils: Pupils are equal, round, and reactive to light.   Cardiovascular:      Rate and Rhythm:  Normal rate.   Pulmonary:      Effort: Pulmonary effort is normal.   Chest:   Breasts:      Right: No supraclavicular adenopathy.      Left: No supraclavicular adenopathy.       Abdominal:      General: Abdomen is protuberant. Bowel sounds are normal. There is no distension.      Palpations: Abdomen is soft. There is no fluid wave, hepatomegaly or splenomegaly.      Tenderness: There is no abdominal tenderness.   Musculoskeletal:         General: Normal range of motion.      Cervical back: Normal range of motion.      Right lower leg: No edema.      Left lower leg: No edema.   Lymphadenopathy:      Upper Body:      Right upper body: No supraclavicular adenopathy.      Left upper body: No supraclavicular adenopathy.   Skin:     General: Skin is warm and dry.      Capillary Refill: Capillary refill takes less than 2 seconds.      Coloration: Skin is not jaundiced.      Findings: No rash.   Neurological:      Mental Status: He is alert and oriented to person, place, and time. Mental status is at baseline.   Psychiatric:         Mood and Affect: Mood normal.         Behavior: Behavior normal. Behavior is cooperative.         Thought Content: Thought content normal.         Judgment: Judgment normal.             Assessment/Plan:       1. Compensated HCV cirrhosis    2. Polyclonal gammopathy    3. High total IgG    4. Hyperlipidemia, unspecified hyperlipidemia type      Problem List Items Addressed This Visit        Immunology/Multi System    Polyclonal gammopathy    Overview     Component Ref Range & Units 6 d ago 2 wk ago   IgG 768 - 1632 mg/dL 1806 Abnormal   1725 Abnormal  CM    Comment: REFERENCE INTERVAL: Immunoglobulin GAccess complete set of age- and/or gender-specific referenceintervals for this test in the [facility] Test Directory([employer]).   IgA 68 - 408 mg/dL 218     Comment: REFERENCE INTERVAL: Immunoglobulin AAccess complete set of age- and/or gender-specific referenceintervals for this test in the  [facility] Test Directory([employer]).   IgM 35 - 263 mg/dL 115       Total Protein, Electrophoresis 6.3 - 8.2 g/dL 7.8    Albumin, Electrophoresis 3.75 - 5.01 g/dL 4.05    Alpha-1-Globulin 0.19 - 0.46 g/dL 0.31    Alpha-2-Globulin 0.48 - 1.05 g/dL 0.91    Beta Globulin 0.48 - 1.10 g/dL 0.80    Gamma Globulin 0.62 - 1.51 g/dL 1.72 Abnormal     Immunofix Interp.  SEE NOTE    Comment: Polyclonal increase in the gamma region.  TREE gel shows anormal pattern; no monoclonal proteins seen.                Current Assessment & Plan     Recommend PCP test him for RF and Anti-CCP.               GI    Compensated HCV cirrhosis - Primary    Current Assessment & Plan     HCV history:     Originally diagnosed: 2021  Prior icteric illnesses: None  Risks for HCV:  none  - Treatment naïve  - Genotype 1a or 1b   - NS5A resistance not known     · Immunity to HAV & HBV ?  · HIV screen NEG  · Hep B Surf Ag Non-reactive  · Hep B Surf Ab non-reactive  · Hep B Core total Ab NON-REACTIVE   · Abnormal liver imaging:      HCV RNA [id number]     FIBROSURE 0.79 - severe firbrosis/cirrhosis      Liver staging:     FibroScan = kPa ,(CAP , S )  Fibrosure = F4  Liver function = ALT 62, , , Tbili 1.1  U/S =      STUDY: COMPLETE ABDOMINAL ULTRASOUND     HISTORY:  Viral hepatitis C.     TECHNIQUE:  Gray scale and color Doppler imaging was utilized.     COMPARISON: No prior similar studies are available for comparison at this institution.     FINDINGS:  Homogeneous hepatic parenchymal echogenicity.  No focal hepatic mass. The portal veins are patent. No intrahepatic biliary dilatation.     The gallbladder demonstrates no evidence of intraluminal calculi or sludge.  There is no wall thickening or pericholecystic fluid.    The common bile duct measures 3 mm.     The right kidney is 13.0 x 6.1 x 5.0 cm.  The left kidney is 13.6 x 4.6 x 4.4 cm.  There is no pelvicaliectasis.  Corticomedullary differentiation is normal.  There are no contour  deforming renal masses or large renal stones.  There is no cortical thinning or scarring.     The pancreatic head and neck are unremarkable. The remainder of the pancreas is obscured by bowel gas and poor acoustic window.     The spleen demonstrates normal echotexture without focal lesions.     The visualized abdominal aorta and retrohepatic inferior vena cava are patent and unremarkable.     No ascites.        IMPRESSION:  No sonographic abnormality of the abdomen.     FIB-4 --   APRI -   MELDS --        HCV RNA (IU) IU/mL 8,153,991    HCV RNA Log by NAAT log IU/mL 6.91    Comment: Hepatitis C Virus Genotype by Sequencing added.   HCV RNA Interp Not Detected DETECTED Abnormal                      PLAN          1. Vaccinate Hep A/B. Too expensive for him right now.   2. US unremarkable   3.  Epclusa x 12 weeks.  Side effects of medication discussed. See AVS for education  4. Fibrosure confirmed F4 cirrhosis. Proceed w/ Epclusa x 12 weeks. Will need EGD baseline and every 2-3 yrs. Referred to Hepatology for further guidance. Start treatment in the meantime.   5. Monitor for increased atorvastatin adverse effects (eg, myopathy, rhabdomyolysis). May need to switch to Pravastatin at that time.  7. Drug to drug potentials reviewed.   8. See AVS for Hep C education.   9. He will get HCV RNA 6 weeks after starting epclusa. Given order. RTC in 8 weeks.         The following information was discussed with the patient:      You are at higher risk for Hepatocellular Carcinoma (HCC).  Compensated   · Presence of scarring/fibrosis throughout the entire liver  · Lacking symptoms and signs of liver disease  ? Jaundice (yellowing skin)  ? Ascites (Swelling/fluid in the abdomen)  ? Hepatic encephalopathy (Neurologic symptoms from toxin buildup)  ? Esophageal varices (bleeding veins)  ? Scleral icterus (yellowing of eyes)  ? Easy bruising/bleeding  ? Fatigue  ? Black tarry stools  ? Nausea/vomiting  ? Abdominal pain is NOT associated    ? Lesions or blisters of sun exposed skin  Your follow up will be every 6 months IF you are confirmed to have cirrhosis.  You are at higher risk for HCC and will need routine testing for life:  1. Ultrasound every 6 months   2. Checking for liver cancer (AFP tumor marker) every 6 months  3. Complete blood counts and complete metabolic panel  You will need an EGD initially and every 2-3 years thereafter.       Result Comment:  Test name                    Result Flag  Units  RefIntvl  ------------------------------------------------------------  PEth 16:0/18:1 (POPEth)         <10       ng/mL  INTERPRETIVE INFORMATION:Phosphatidylethanol (PEth), Whole Blood  Phosphatidylethanol (PEth) homologues Result Interpretation  PEth 16:0/18:1 (POPEth)  Less than 10 ng/mL............Not detected  Less than 20 ng/mL............Abstinence or light alcohol                                consumption  20 - 200 ng/mL................Moderate alcohol consumption  Greater than 200 ng/mL........Heavy alcohol consumption or                                chronic alcohol use  PEth 16:0/18:2 (PLPEth).......Reference ranges are not well                                established.             Relevant Orders    Hepatitis C RNA, Quantitative, PCR       Other    High total IgG    Current Assessment & Plan     Hypergammaglobulinemia is frequently observed in patients with chronic liver disease. Elevated levels of serum immunoglobulin G (IgG) are the best diagnostic marker for autoimmune hepatitis.           PLAN    1. Recommend he follow up with hepatology in [place]. \  2. Recommend PCP order RF and Anti-ccp to evaluate rheumatoid.                   Other Visit Diagnoses     Hyperlipidemia, unspecified hyperlipidemia type             Orders Only on 03/17/2022   Component Date Value Ref Range Status    Mitochondrial Ab 03/17/2022 17.4  0.0 - 24.9 Units Final    Comment: REFERENCE INTERVAL: Mitochondrial (M2) Antibody, IgG  20.0 Units or  less ......... Negative  20.1 - 24.9 Units........... Equivocal  25.0 Units or greater....... PositiveAnti-mitochondrial antibodies (AMA) are thought to be present in90-95% of patients   with primary biliary cholangitis (PBC).However, the frequency of detected antibodies may be cohort orassay dependent, as lower sensitivities have been reported. Notall PBC patients are positive for AMA; some patients may bepositive for  and/or    antibodies. A negative resultdoes not rule out PBC.Performed By: [facility] [place] [address] [id number] Director: Maeve Waters MD      Interpretation 03/17/2022 SEE NOTE   Final    Comment: When TREE GEL (Immunofixation Electrophoresis, test code [id number])is ordered in conjunction with SPEP (Serum ProteinElectrophoresis, test code [id number]), the interpretive data forthe TREE will chart with that of the SPEP.INTERPRETIVE INFORMATION: Immunofix   Electrophoresis, SerumThis information should be correlated with the results of serumprotein electrophoresis, quantitative immunoglobulins and otherclinical and laboratory information.      REPORT 03/17/2022 EERUNAVAILABLE   Final    Performed By: [facility] [place] [address] [id number] Director: Maeve Waters MD   Office Visit on 03/10/2022   Component Date Value Ref Range Status    IgG 03/17/2022 1806 (A) 768 - 1632 mg/dL Final    REFERENCE INTERVAL: Immunoglobulin GAccess complete set of age- and/or gender-specific referenceintervals for this test in the [facility] Test Directory([email or web address]).    IgA 03/17/2022 218  68 - 408 mg/dL Final    REFERENCE INTERVAL: Immunoglobulin AAccess complete set of age- and/or gender-specific referenceintervals for this test in the [facility] Test Directory([email or web address]).    IgM 03/17/2022 115  35 - 263 mg/dL Final    Comment: REFERENCE INTERVAL: Immunoglobulin MAccess complete set of age- and/or gender-specific referenceintervals for  this test in the [facility] Test Directory([email or web address]).Performed By: [facility] [place] [address] [id number]   Director: Maeve Waters MD      Total Protein, Electrophoresis 03/17/2022 7.8  6.3 - 8.2 g/dL Final    Albumin, Electrophoresis 03/17/2022 4.05  3.75 - 5.01 g/dL Final    Alpha-1-Globulin 03/17/2022 0.31  0.19 - 0.46 g/dL Final    Alpha-2-Globulin 03/17/2022 0.91  0.48 - 1.05 g/dL Final    Beta Globulin 03/17/2022 0.80  0.48 - 1.10 g/dL Final    Gamma Globulin 03/17/2022 1.72 (A) 0.62 - 1.51 g/dL Final    Immunofix Interp. 03/17/2022 SEE NOTE   Final    Polyclonal increase in the gamma region.  TREE gel shows anormal pattern; no monoclonal proteins seen.    EER Protein Electrophoresis 03/17/2022 SEE NOTE   Final    Access [facility] Enhanced Report using the link below:-Direct access:[email or web address] By: [facility] [place] [address] [id number] Director: Maeve Waters MD    SURJIT 03/17/2022 NONE DETECTED  None Detected Final    Comment: If suspicion of connective tissue disease is strong and SURJIT EIAis negative, consider testing for SURJIT by IFA ([id number]).No antibodies to Anti-Nuclear Antibodies (SURJIT) detected.  TheExtractable Nuclear Antigen Antibodies (RNP, Olvera, SSA 52, SSA60,   Scleroderma, Miladis-1 and SSB) and Double Stranded DNA (dsDNA)Antibody, IgG will not be performed.INTERPRETIVE INFORMATION: Anti-Nuclear Antibodies (SURJIT), IgG byELISAAntinuclear Antibodies (SURJIT), IgG by CHRIS: SURJIT specimens arescreened using enzyme-linked   immunosorbent assay (CHRIS)methodology. All CHRIS results reported as Detected are furthertested by indirect fluorescent assay (IFA) using HEp-2 substratewith an IgG-specific conjugate. The SURJIT CHRIS screen is designedto detect antibodies against dsDNA,   histones, SS-A (Ro), SS-B(La), Olvera, Smith/RNP, Scl-70, Miladis-1, centromeric proteins,other antigens extracted from the HEp-2  cell nucleus. SURJIT ELISAassays have been reported to have lower sensitivities than ANAIFA for systemic autoimmune rheum                           atic   diseases (SARD).Negative results do not necessarily rule out SARD.Performed By: [facility] [place] [address] [id number] Director: Maeve Waters MD      Actin Antibody 03/17/2022 8  0 - 19 Units Final    Comment:  If F-Actin (Smooth Muscle) Antibody, IgG is negative, theSmooth Muscle Antibody titer by IFA is not performed.REFERENCE INTERVAL: F-Actin (Smooth Muscle) Antibody, IgG by                    CHRIS  19 Units or less ....... Negative  20 - 30 Units   .......... Weak Positive-Suggest repeat                           testing in two to three weeks                           with fresh specimen.  31 Units or greater..... Positive-Suggestive of                           autoimmune hepatitis type 1                             or chronic active hepatitis.F-actin IgG antibodies have been shown to have increasedsensitivity for autoimmune hepatitis (AIH) but lower specificitythan smooth muscle antibodies (SMA). F-actin IgG antibodies canalso be seen in   SMA-negative disease controls (non-AIH),especially in patients with primary biliary cirrhosis andchronic hepatitis C infections. Some patients with AIH may beSMA-positive but negative for F-actin IgG. Consider testing forSMA by IFA if suspicion for                            AIH   is strong.Performed By: [facility] [place] [address] [id number] Director: Maeve Waters MD     Orders Only on 03/03/2022   Component Date Value Ref Range Status    HCV RNA Quant PCR 03/03/2022 [id number]   Final    HCV RNA Quant PCR LOG 03/03/2022 6.91   Final    HCV RNA Quant by NAAT 03/03/2022 SEETXT   Final    Comment: Hepatitis C Virus (HCV) by Quantitative NAAT with Reflex toHCV Genotype [facility] test code [id number] Qnt by NAAT (IU/mL)8,153,991 IU/mL- - - - - - - - - - -  "- - - - - - - - - - - - - - - - - - -HCV Qnt by NAAT (log IU/mL)6.91 log IU/mLHepatitis   C Virus Genotype by Sequencing added.- - - - - - - - - - - - - - - - - - - - - - - - - - - - - -HCV Qnt by NAAT InterpDetected     abnormal   (Ref Interval: Not Detected)INTERPRETIVE INFORMATION: HCV by Quantitative NAATNormal range for this assay is   "Not Detected".The quantitative range of this assay is 10 - 100,000,000IU/mL(1.0 - 8.0 log IU/mL).Lower limit of quantitation (LLoQ):10 IU/mL (1.0 log IU/mL)LLoQ values do not apply to diluted specimens.A result of "Not Detected" does not rule out the   presence ofinhibitors in the patient specimen or hepatitis C virus RNAconcentrations below the level of detection of the test.Careshould be taken when interpreting any single viral loaddetermination.This test should not be used for bloo                           d donor   screening,associated re-entry protocols, or for screening Human Cell,Tissues and Cellular Tissue-Based Products (HCT/P).===========================================================      HCV Ab 03/03/2022 HIGH POS (A) Negative Final    Comment: High Positive for anti-HCV.  HCV RNA testing will determine ifthe patient is currently infected.The anti-HCV (FARIBA) screen is reactive. Refer to the HCV RNA byQuantitative NAAT result for additional detail.INTERPRETIVE INFORMATION:Hepatitis C Virus   Antibody by FARIBA Index:  0.79 IV or less .................. Negative  0.80 to 0.99 IV .................. Equivocal  1.00 to 10.99 IV ................. Low Positive  11.00 IV or greater .............. High Positive  Index Value (IV) = Anti-HCV signal to   cutoff (S/C)ratioThis assay should not be used for blood donor screening,associated re-entry protocols, or for screening Human Cells,Tissues and Cellular and Tissue-Based Products (HCT/P).      HCV Ab Index 03/03/2022 >11.00  IV Final    Performed By: [facility] [place] [address] [id number] Director: " Maeve Waters MD, MS    Alpha-1 Anti-Trypsin 03/03/2022 158  90 - 200 mg/dL Final    To convert to umol/L, multiply mg/dL by 0.185Performed By: [facility] [address] [place] [address] [id number] Director: Maeve Waters MD    Ceruloplasmin 03/03/2022 22  15 - 30 mg/dL Final    Comment: REFERENCE INTERVAL: CeruloplasminAccess complete set of age- and/or gender-specific referenceintervals for this test in the [facility] Laboratory Test Directory([email or web address]).Performed By: [facility] [address] [place] [address] [id number]   Director: Maeve Waters MD      IgG 03/03/2022 1725 (A) 768 - 1632 mg/dL Final    Comment: REFERENCE INTERVAL: Immunoglobulin GAccess complete set of age- and/or gender-specific referenceintervals for this test in the [facility] Test Directory([email or web address]).Performed By: [facility] [address] [place], [place] [id number]   Director: Maeve Waters MD      T Pallidum Antibodies 03/03/2022 0.1  <=0.9 IV Final    Comment: INTERPRETIVE INFORMATION: T.pallidum Ab, IgG by CHRIS  0.9 IV or less ........ Negative: No significant level                          of Treponema pallidum IgG                          antibody detected.  1.0 IV................. Equivocal: Questionable   presence                          of Treponema pallidum IgG                          antibody detected. Repeat                          testing in 10-14 days may be                          helpful.  1.1 IV or greater ..... Positive: Presence of IgG                            antibody to Treponema pallidum                          detected, suggestive of current                          or past infection.Seroconversion between acute and convalescent sera is consideredstrong evidence of recent infection.   The best evidence forinfection is a significant change on two appropriately timedspecimens, where both tests are performed in the same laboratoryat the same time. The  Rapid Plasma Reagin (RPR) with Reflex toRPR Titer or T. pallidum A                           ntibody by Particle   Agglutination([id number]) is recommended for follow-up of positive results. ForCSF specimens, the Treponema pallidum (VDRL), CerebrospinalFluid with Reflex to Titer ([id number]) test is recommended.Performed By: [facility] [place] [address] [id number] Director: Maeve Waters MD      AFP-Tumor Marker 03/03/2022 8  0 - 9 ng/mL Final    Comment: INTERPRETIVE INFORMATION: Alpha Fetoprotein Tumor MarkerThe Tutum Access DxI AFP method is used. Resultsobtained with different assay methods or kits cannot be usedinterchangeably. AFP is a valuable aid in the management ofnonseminomatous   testicular cancer patients when used inconjunction with information available from the clinicalevaluation and other diagnostic procedures. Increased AFPconcentrations have also been observed in ataxia telangiectasia,hereditary tyrosinemia, primary   hepatocellular carcinoma,teratocarcinoma, gastrointestinal tract cancers with and withoutliver metastases, and in benign hepatic conditions such as acuteviral hepatitis, chronic active hepatitis, and cirrhosis. Theresult cannot be interpreted as absolute   evidence of thepresence or absence of malignant disease. The result is notinterpretable as a tumor marker in pregnant females.Access complete set of age- and/or gender-specific referenceintervals for this test in the [facility] Test   Di                           rectory(STinser).Performed By: [facility] [place] [address] [id number] Director: Maeve Waters MD     Orders Only on 03/03/2022   Component Date Value Ref Range Status    Alpha 2-Macroglobulins, Qn 03/03/2022 376 (A) 131 - 293 mg/dL Final    ALT 03/03/2022 70 (A) 5 - 50 U/L Final    AST 03/03/2022 41  9 - 50 U/L Final    GGT 03/03/2022 88 (A) 7 - 51 U/L Final    BUN 03/03/2022 14  7 - 20 mg/dL  Final    Platelets 03/03/2022 135  k/uL Final    PT Index 03/03/2022 86 (A) 90 - 120 % Final    Fibrosis Score 03/03/2022 0.92   Final    CIRRHOMETER PATIENT SCORE 03/03/2022 0.54   Final    FIBROSIS METAVIR CLASSIFY 03/03/2022 F4[F3-F4]   Final    Comment: INTERPRETIVE INFORMATION: Fibrosis Metavir ClassificationFibroMeter (fibrosis score) commentsF0/F1      Equal probability between F0 and F1F1[F1-F2]  Predominance of F1, but F2 is possibleF2[F1-F2]  Predominance of F2, but F1 is possibleF2[F1-F3]    Predominance of F2, but F1 and F3 are possibleF2/F3      Equal probability between F2 and F3F3[F2-F4]  Predominance of F3, but F2 and F4 are possibleF3[F3-F4]  Predominance of F3, but F4 is possibleF4[F3-F4]  Predominance of F4, but F3 is possible      INFLAMETER PATIENT SCORE 03/03/2022 0.74   Final    INFLAMETER METAVIR CLASSIFY 03/03/2022 A2/A3   Final    INTERPRETIVE INFORMATION: InflaMeter Metavir ClassificationInflaMeter (activity score) commentsA0/A1     Equal probability between A0 and A1A1/A2     Equal probability between A1 and A2A2/A3     Equal probability between A2 and A3    EER FIBROMETER REPORT 03/03/2022 SEE NOTE   Final    Access [facility] Enhanced Report using the link below:-Direct access: [email or web address]    FibroMeter Interpretation 03/03/2022 SEE REPORT   Final    Comment: [17][13]INTERPRETIVE INFORMATION: Fibrometer InterpretationCalculations for the final report are based on accurate data forage, gender, and platelet count. If any of this informationneeds to be corrected, please contact [facility] Client Services torequest a   recalculation. Client Services may be contacted [phone].The E[employer]ens FibroMeter profile serves as a surrogate marker ofliver fibrosis, cirrhosis, and necro-inflammatory activity. Aproprietary algorithm calculates and compares results from 7blood   markers along with age and gender to provide a patientscore (from 0 to 1) and a  "correlated fibrosis stage (MetavirF0-F4) and activity grade (Metavir A0-A3). Thefibrosis/cirrhosis score is further evaluated by a rules-basedsystem to detect anomalous   profile results which may modify thefibrosis/cirrhosis score as needed.Results should be interpreted in conjunction with the patient'sclinical history; particularly when the rules-based system hasmodified the scores.Legend:-Metavir is a                            histological   scoring system for determining theextent of liver fibrosis and inflammation.STAGE OF FIBROSIS (F scale)F0 = no fibrosisF1 = portal fibrosis without septaF2 = portal fibrosis with few septaF3 = numerous septa without cirrhosisF4 = cirrhosisGRADE OF   NECRO-INFLAMMATORY ACTIVITY (A scale)A0 = no activityA1 = mild activityA2 = moderate activityA3 = severe activity-Platelet count result provided by client.-The Prothrombin Index test expresses the Prothrombin Time (PT)as a percentage of normal, and is   used to standardize PT resultsacross different instrument/reagent combinations.This test was developed and its performance characteristicsdetermined by [employer] [facility]. It has not been cleared orapproved by the [place] Food and Drug Administration. This   test wasperformed in a CLIA certified laboratory and is intended forclinical purposes.Performed By: [facility] [place] [address] [id number] Director: Maeve Waters MD     Orders Only on 03/03/2022   Component Date Value Ref Range Status    HCV RNA (IU) 03/03/2022 8,153,991  IU/mL Final    HCV RNA Log by NAAT 03/03/2022 6.91  log IU/mL Final    Hepatitis C Virus Genotype by Sequencing added.    HCV RNA Interp 03/03/2022 DETECTED (A) Not Detected Final    Comment: INTERPRETIVE INFORMATION: HCV by Quantitative NAATNormal range for this assay is "Not Detected".The quantitative range of this assay is 10 - 100,000,000 IU/mL(1.0 - 8.0 log IU/mL).Lower limit of quantitation (LLoQ):10 IU/mL (1.0 log " "IU/mL)LLoQ values do   not apply to diluted specimens.A result of "Not Detected" does not rule out the presence ofinhibitors in the patient specimen or hepatitis C virus RNAconcentrations below the level of detection of the test. Careshould be taken when interpreting any   single viral loaddetermination.This test should not be used for blood donor screening,associated re-entry protocols, or for screening Human Cell,Tissues and Cellular Tissue-Based Products (HCT/P).Performed By: [facility] [place] [address] [id number] Director: Maeve Waters MD, MS      HCV Genotype 03/03/2022 1A OR 1B   Final    Comment: Cannot be further subtyped into Type 1a or Type 1b due tohigh conservation of the 5' untranslated region of the HCVgenome. In addition, Type 6 virus may be misclassified asType 1 in some cases. The Hepatitis C Virus High-ResolutionGenotype by Sequencing   test (SysClass test code 4846518)provides a higher level of subtype resolution.INTERPRETIVE INFORMATION:  Hepatitis C GenotypingHepatitis C Viral RNA is tested using reverse transcriptionpolymerase chain reaction (RT-PCR) to amplify a specific portionof the   5' untranslated region (5' UTR) of the viral genome. Theamplified nucleic acid is sequenced bi-directionally usingdye-terminator chemistry ([employer]). Sequencing data is compared to adatabase of characterized sequences.Isolates of hepatitis C virus are   grouped into six majorgenotypes (1-6). These genotypes are subtyped according tosequence characteristics. Due to high conservation of the 5'un-translated region of the HCV genome, this test haslimitations in differentiating subtype 1a fro                           m 1b.   Therefore,these subtypes will be reported as 1a or 1b. In rare instances,Type 6 virus may be misclassified as Type 1.This test was developed and its performance characteristicsdetermined by [facility]. It has not been cleared orapproved by the [place]   Food and " Drug Administration. This test wasperformed in a CLIA certified laboratory and is intended forclinical purposes.Performed By: [facility] [place] [address] [id number] Director: Maeve Waters MD     Orders Only on 03/03/2022   Component Date Value Ref Range Status    Sodium 03/03/2022 140  131 - 143 mmol/L Final    Potassium 03/03/2022 4.6  3.5 - 5.1 mmol/L Final    Chloride 03/03/2022 105  98 - 107 mmol/L Final    CO2 03/03/2022 31  21 - 32 mmol/L Final    Anion Gap 03/03/2022 4.0  3.0 - 11.0 mmol/L Final    BUN 03/03/2022 14.0  7.0 - 18.0 mg/dL Final    Creatinine 03/03/2022 0.866  0.700 - 1.30 mg/dL Final    GFR ESTIMATION 03/03/2022 > 60  >60 Final    Glucose 03/03/2022 187 (A) 74 - 106 mg/dL Final    Calcium 03/03/2022 9.1  8.5 - 10.1 mg/dL Final    Total Bilirubin 03/03/2022 0.8  0.2 - 1.0 mg/dL Final    AST 03/03/2022 36  15 - 37 U/L Final    ALT 03/03/2022 84 (A) 16 - 61 U/L Final    Total Protein 03/03/2022 7.3  6.4 - 8.2 g/dL Final    Albumin 03/03/2022 3.5  3.4 - 5.0 g/dL Final    Alkaline Phosphatase 03/03/2022 80  45 - 117 U/L Final    LD 03/03/2022 159  87 - 241 U/L Final    Iron 03/03/2022 131  65 - 175 ug/dL Final    TIBC 03/03/2022 315  250 - 450 ug/dL Final    % Saturation 03/03/2022 42  20 - 50 % Final   Orders Only on 03/03/2022   Component Date Value Ref Range Status    Ammonia 03/03/2022 34  19 - 54 umol/L Final   Orders Only on 03/03/2022   Component Date Value Ref Range Status    Prothrombin Time 03/03/2022 12.4  10.2 - 12.9 sec Final    INR 03/03/2022 1.1  0.9 - 1.1 INR Final    aPTT 03/03/2022 36.0  25.1 - 36.5 sec Final    PTT Critical Values:non-therapeutic: +/> 90 secondstherapeutic:     +/> 180 secondsCONTACT CLINICAL PHARMACIST FOR ASSISTANCE WITH HEPARINPROTOCOLS.   Orders Only on 03/03/2022   Component Date Value Ref Range Status    WBC 03/03/2022 7.1  4.5 - 10.0 10*3/uL Final    RBC 03/03/2022 5.03  4.70 - 6.10 10*6/uL Final     Hemoglobin 2022 15.2  14.0 - 18.0 g/dL Final    Hematocrit 2022 44.8  42.0 - 52.0 % Final    MCV 2022 89.1  80.0 - 97.0 fL Final    MCH 2022 30.2  27.0 - 32.0 pg Final    MCHC 2022 33.9  32.0 - 36.0 % Final    RDW RBC Auto-Rto 2022 12.2  0.0 - 15.5 % Final    Platelets 2022 135  130 - 400 10*3/uL Final    MPV 2022 11.9  9.2 - 12.2 fL Final    Neutrophils 2022 57.3  50 - 80 % Final    Immature Granulocytes 2022 0.10  0.0 - 0.43 % Final    Lymphocytes 2022 30.5  20.0 - 45.0 % Final    Monocytes 2022 9.1  2 - 10 % Final    Eosinophils 2022 2.1  0 - 6 % Final    Basophils 2022 0.9  0 - 3 % Final    nRBC# 2022 0.0  0 - 0.2 % Final    Neutrophils Absolute 2022 4.0  1.4 - 7.0 10*3/uL Final    Immature Granulocytes Absolute 2022 0.0100  0.0 - 0.0310 thou/uL Final    Lymphocytes Absolute 2022 2.2  1.2 - 4.0 10*3/uL Final    Monocytes Absolute 2022 0.6  0.1 - 0.8 10*3/uL Final    Eosinophils Absolute 2022 0.2  0.0 - 0.6 10*3/uL Final    Basophils Absolute 2022 0.1  0.0 - 0.3 10*3/uL Final    nRBC Count Absolute 2022 0.000  0 - 0.012 thou/uL Final      US Abdomen Complete    Result Date: 3/3/2022                                RADIOLOGY REPORT        PT NAME: CHELI RAHMAN [facility]     : 1955 M 66             [address]    ACCT: [id number]                                              [facility] #: [id number]                                        [id number]    Patient Location: AL.RADUS/             Procedure: ABDOMEN COMPLETE    REQUISITION #: [id number]      REPORT #: [id number]           DATE OF EXAM: 22    TIME OF EXAM: 0800       STUDY: COMPLETE ABDOMINAL ULTRASOUND        HISTORY:  Viral hepatitis C.        TECHNIQUE:  Gray scale and color Doppler imaging was utilized.        COMPARISON: No prior  similar studies are available for comparison at this   institution.        FINDINGS:    Homogeneous hepatic parenchymal echogenicity.  No focal hepatic mass. The   portal veins are patent. No intrahepatic biliary dilatation.        The gallbladder demonstrates no evidence of intraluminal calculi or sludge.    There is no wall thickening or pericholecystic fluid.    The common bile   duct measures 3 mm.        The right kidney is 13.0 x 6.1 x 5.0 cm.    The left kidney is 13.6 x 4.6 x 4.4 cm.    There is no pelvicaliectasis.  Corticomedullary differentiation is normal.     There are no contour deforming renal masses or large renal stones.  There is   no cortical thinning or scarring.        The pancreatic head and neck are unremarkable. The remainder of the pancreas   is obscured by bowel gas and poor acoustic window.        The spleen demonstrates normal echotexture without focal lesions.        The visualized abdominal aorta and retrohepatic inferior vena cava are   patent and unremarkable.        No ascites.            IMPRESSION:    No sonographic abnormality of the abdomen.        DICTATING PHYSICIAN:  FERDINAND ARGUETA MD                   Date Dictated: 03/03/22 1008        Signed By:  FERDINAND ARGUETA MD <Electronically signed by FERDINAND ARGUETA MD in    OV>    Date Signed:  03/03/22 1009     CC: PRIYANKA ROSS ; PRIYANKA ROSS      ADMITTING PHYSICIAN:                                                                                                    ORDERING PHY: PRIYANKA ROSS                                                                                                                                                      ATTENDING PHY: PRIYANKA ROSS    Patient Status:  REG CLI    Admit Service Date: 03/03/22               Duration of encounter: 30 minutes  This includes face-to-face time and non face-to-face time preparing to see the patient (eg, review of tests), obtaining and/or reviewing  separately obtained history, documenting clinical information in the electronic or other health record, independently interpreting resultsand communicating results to the patient/family/caregiver, or care coordination.      All diagnostic data (labs/imaging) was reviewed with the patient and/or family member in the room.  All questions were answered to their liking. The patient and/or family member voiced understanding of all instructions provided. Expectations regarding follow up and treatment plan were voiced and confirmed prior to departure. The patient was given orders/instructions at the end of the visit for reference. They were instructed to notify my office if they have not been contacted for imaging/referrals/labs/results in 1-2 weeks. They voiced understanding of all of the above.     Follow up:     There are no Patient Instructions on file for this visit.     Follow up in about 8 weeks (around 5/26/2022), or if symptoms worsen or fail to improve. edw0

## 2022-03-31 NOTE — TELEPHONE ENCOUNTER
Attempted to contact patient at [phone] with no answer. Left voicemail message for patient to return call.

## 2022-03-31 NOTE — ASSESSMENT & PLAN NOTE
HCV history:     Originally diagnosed: 2021  Prior icteric illnesses: None  Risks for HCV:  none  - Treatment naïve  - Genotype 1a or 1b   - NS5A resistance not known     · Immunity to HAV & HBV ?  · HIV screen NEG  · Hep B Surf Ag Non-reactive  · Hep B Surf Ab non-reactive  · Hep B Core total Ab NON-REACTIVE   · Abnormal liver imaging:      HCV RNA [id number]     FIBROSURE 0.79 - severe firbrosis/cirrhosis      Liver staging:     FibroScan = kPa ,(CAP , S )  Fibrosure = F4  Liver function = ALT 62, , , Tbili 1.1  U/S =      STUDY: COMPLETE ABDOMINAL ULTRASOUND     HISTORY:  Viral hepatitis C.     TECHNIQUE:  Gray scale and color Doppler imaging was utilized.     COMPARISON: No prior similar studies are available for comparison at this institution.     FINDINGS:  Homogeneous hepatic parenchymal echogenicity.  No focal hepatic mass. The portal veins are patent. No intrahepatic biliary dilatation.     The gallbladder demonstrates no evidence of intraluminal calculi or sludge.  There is no wall thickening or pericholecystic fluid.    The common bile duct measures 3 mm.     The right kidney is 13.0 x 6.1 x 5.0 cm.  The left kidney is 13.6 x 4.6 x 4.4 cm.  There is no pelvicaliectasis.  Corticomedullary differentiation is normal.  There are no contour deforming renal masses or large renal stones.  There is no cortical thinning or scarring.     The pancreatic head and neck are unremarkable. The remainder of the pancreas is obscured by bowel gas and poor acoustic window.     The spleen demonstrates normal echotexture without focal lesions.     The visualized abdominal aorta and retrohepatic inferior vena cava are patent and unremarkable.     No ascites.        IMPRESSION:  No sonographic abnormality of the abdomen.     FIB-4 --   APRI -   MELDS --        HCV RNA (IU) IU/mL 8,153,991    HCV RNA Log by NAAT log IU/mL 6.91    Comment: Hepatitis C Virus Genotype by Sequencing added.   HCV RNA Interp Not Detected  DETECTED Abnormal                      PLAN          1. Vaccinate Hep A/B. Too expensive for him right now.   2. US unremarkable   3.  Epclusa x 12 weeks.  Side effects of medication discussed. See AVS for education  4. Fibrosure confirmed F4 cirrhosis. Proceed w/ Epclusa x 12 weeks. Will need EGD baseline and every 2-3 yrs. Referred to Hepatology for further guidance. Start treatment in the meantime.   5. Monitor for increased atorvastatin adverse effects (eg, myopathy, rhabdomyolysis). May need to switch to Pravastatin at that time.  7. Drug to drug potentials reviewed.   8. See AVS for Hep C education.   9. He will get HCV RNA 6 weeks after starting epclusa. Given order. RTC in 8 weeks.         The following information was discussed with the patient:      You are at higher risk for Hepatocellular Carcinoma (HCC).  Compensated   · Presence of scarring/fibrosis throughout the entire liver  · Lacking symptoms and signs of liver disease  ? Jaundice (yellowing skin)  ? Ascites (Swelling/fluid in the abdomen)  ? Hepatic encephalopathy (Neurologic symptoms from toxin buildup)  ? Esophageal varices (bleeding veins)  ? Scleral icterus (yellowing of eyes)  ? Easy bruising/bleeding  ? Fatigue  ? Black tarry stools  ? Nausea/vomiting  ? Abdominal pain is NOT associated   ? Lesions or blisters of sun exposed skin  Your follow up will be every 6 months IF you are confirmed to have cirrhosis.  You are at higher risk for HCC and will need routine testing for life:  1. Ultrasound every 6 months   2. Checking for liver cancer (AFP tumor marker) every 6 months  3. Complete blood counts and complete metabolic panel  You will need an EGD initially and every 2-3 years thereafter.       Result Comment:  Test name                    Result Flag  Units  RefIntvl  ------------------------------------------------------------  PEth 16:0/18:1 (POPEth)         <10       ng/mL  INTERPRETIVE INFORMATION:Phosphatidylethanol (PEth), Whole  Blood  Phosphatidylethanol (PEth) homologues Result Interpretation  PEth 16:0/18:1 (POPEth)  Less than 10 ng/mL............Not detected  Less than 20 ng/mL............Abstinence or light alcohol                                consumption  20 - 200 ng/mL................Moderate alcohol consumption  Greater than 200 ng/mL........Heavy alcohol consumption or                                chronic alcohol use  PEth 16:0/18:2 (PLPEth).......Reference ranges are not well                                established.

## 2022-04-05 ENCOUNTER — PATIENT MESSAGE (OUTPATIENT)
Dept: HEPATOLOGY | Facility: CLINIC | Age: 67
End: 2022-04-05
Payer: MEDICARE

## 2022-04-11 ENCOUNTER — TELEPHONE (OUTPATIENT)
Dept: HEPATOLOGY | Facility: CLINIC | Age: 67
End: 2022-04-11
Payer: MEDICARE

## 2022-04-11 NOTE — TELEPHONE ENCOUNTER
Attempted to contact patient at [phone] with no answer. Left voicemail message for patient to return call.     A letter has been mailed to patient home address on file.

## 2022-04-18 ENCOUNTER — PATIENT MESSAGE (OUTPATIENT)
Dept: ADMINISTRATIVE | Facility: OTHER | Age: 67
End: 2022-04-18
Payer: MEDICARE

## 2022-10-19 ENCOUNTER — PATIENT MESSAGE (OUTPATIENT)
Dept: FAMILY MEDICINE | Facility: CLINIC | Age: 67
End: 2022-10-19
Payer: MEDICARE

## 2022-10-19 DIAGNOSIS — K74.69 COMPENSATED HCV CIRRHOSIS: Primary | ICD-10-CM

## 2022-10-19 DIAGNOSIS — B19.20 COMPENSATED HCV CIRRHOSIS: Primary | ICD-10-CM

## 2022-10-19 RX ORDER — GLECAPREVIR AND PIBRENTASVIR 40; 100 MG/1; MG/1
3 TABLET, FILM COATED ORAL DAILY
Qty: 168 TABLET | Refills: 0 | Status: SHIPPED | OUTPATIENT
Start: 2022-10-19 | End: 2022-12-14

## 2022-10-19 NOTE — TELEPHONE ENCOUNTER
He will need to see me in the meantime for management of his hepatitis C. Please schedule a follow up once he receives his RX.  Treatment will be Mayvret = 3 tablets daily for 8 weeks.  I will write the RX out. You can fax it to who he needs.

## 2022-11-04 ENCOUNTER — PATIENT MESSAGE (OUTPATIENT)
Dept: FAMILY MEDICINE | Facility: CLINIC | Age: 67
End: 2022-11-04
Payer: MEDICARE

## 2022-11-16 ENCOUNTER — PATIENT MESSAGE (OUTPATIENT)
Dept: FAMILY MEDICINE | Facility: CLINIC | Age: 67
End: 2022-11-16
Payer: MEDICARE